# Patient Record
Sex: MALE | Race: WHITE | NOT HISPANIC OR LATINO | Employment: FULL TIME | URBAN - METROPOLITAN AREA
[De-identification: names, ages, dates, MRNs, and addresses within clinical notes are randomized per-mention and may not be internally consistent; named-entity substitution may affect disease eponyms.]

---

## 2020-04-27 ENCOUNTER — HOSPITAL ENCOUNTER (EMERGENCY)
Facility: HOSPITAL | Age: 60
Discharge: HOME | End: 2020-04-27
Attending: FAMILY MEDICINE

## 2020-04-27 VITALS
RESPIRATION RATE: 16 BRPM | DIASTOLIC BLOOD PRESSURE: 91 MMHG | HEART RATE: 100 BPM | HEIGHT: 72 IN | SYSTOLIC BLOOD PRESSURE: 169 MMHG | TEMPERATURE: 99 F | WEIGHT: 200 LBS | BODY MASS INDEX: 27.09 KG/M2 | OXYGEN SATURATION: 97 %

## 2020-04-27 DIAGNOSIS — F10.930 ALCOHOL WITHDRAWAL SYNDROME WITHOUT COMPLICATION: ICD-10-CM

## 2020-04-27 DIAGNOSIS — F41.9 ANXIETY: Primary | ICD-10-CM

## 2020-04-27 LAB
ALBUMIN SERPL BCP-MCNC: 4 G/DL (ref 3.5–5.2)
ALP SERPL-CCNC: 89 U/L (ref 55–135)
ALT SERPL W/O P-5'-P-CCNC: 49 U/L (ref 10–44)
AMPHET+METHAMPHET UR QL: NEGATIVE
ANION GAP SERPL CALC-SCNC: 18 MMOL/L (ref 8–16)
AST SERPL-CCNC: 84 U/L (ref 10–40)
BARBITURATES UR QL SCN>200 NG/ML: NEGATIVE
BASOPHILS # BLD AUTO: 0.05 K/UL (ref 0–0.2)
BASOPHILS NFR BLD: 0.6 % (ref 0–1.9)
BENZODIAZ UR QL SCN>200 NG/ML: NEGATIVE
BILIRUB SERPL-MCNC: 1.5 MG/DL (ref 0.1–1)
BUN SERPL-MCNC: 18 MG/DL (ref 6–20)
BZE UR QL SCN: NEGATIVE
CALCIUM SERPL-MCNC: 9 MG/DL (ref 8.7–10.5)
CANNABINOIDS UR QL SCN: NEGATIVE
CHLORIDE SERPL-SCNC: 96 MMOL/L (ref 95–110)
CO2 SERPL-SCNC: 21 MMOL/L (ref 23–29)
CREAT SERPL-MCNC: 1.1 MG/DL (ref 0.5–1.4)
CREAT UR-MCNC: 48.9 MG/DL (ref 23–375)
DIFFERENTIAL METHOD: ABNORMAL
EOSINOPHIL # BLD AUTO: 0.1 K/UL (ref 0–0.5)
EOSINOPHIL NFR BLD: 0.6 % (ref 0–8)
ERYTHROCYTE [DISTWIDTH] IN BLOOD BY AUTOMATED COUNT: 13.1 % (ref 11.5–14.5)
EST. GFR  (AFRICAN AMERICAN): >60 ML/MIN/1.73 M^2
EST. GFR  (NON AFRICAN AMERICAN): >60 ML/MIN/1.73 M^2
ETHANOL SERPL-MCNC: 148 MG/DL
GLUCOSE SERPL-MCNC: 68 MG/DL (ref 70–110)
HCT VFR BLD AUTO: 43.2 % (ref 40–54)
HGB BLD-MCNC: 14.5 G/DL (ref 14–18)
IMM GRANULOCYTES # BLD AUTO: 0.02 K/UL (ref 0–0.04)
IMM GRANULOCYTES NFR BLD AUTO: 0.2 % (ref 0–0.5)
LYMPHOCYTES # BLD AUTO: 2.1 K/UL (ref 1–4.8)
LYMPHOCYTES NFR BLD: 26.1 % (ref 18–48)
MCH RBC QN AUTO: 33.3 PG (ref 27–31)
MCHC RBC AUTO-ENTMCNC: 33.6 G/DL (ref 32–36)
MCV RBC AUTO: 99 FL (ref 82–98)
MONOCYTES # BLD AUTO: 1.1 K/UL (ref 0.3–1)
MONOCYTES NFR BLD: 13.4 % (ref 4–15)
NEUTROPHILS # BLD AUTO: 4.9 K/UL (ref 1.8–7.7)
NEUTROPHILS NFR BLD: 59.1 % (ref 38–73)
NRBC BLD-RTO: 0 /100 WBC
OPIATES UR QL SCN: NEGATIVE
PCP UR QL SCN>25 NG/ML: NEGATIVE
PLATELET # BLD AUTO: 110 K/UL (ref 150–350)
PMV BLD AUTO: 10 FL (ref 9.2–12.9)
POTASSIUM SERPL-SCNC: 4.3 MMOL/L (ref 3.5–5.1)
PROT SERPL-MCNC: 8.2 G/DL (ref 6–8.4)
RBC # BLD AUTO: 4.36 M/UL (ref 4.6–6.2)
SODIUM SERPL-SCNC: 135 MMOL/L (ref 136–145)
TOXICOLOGY INFORMATION: NORMAL
WBC # BLD AUTO: 8.21 K/UL (ref 3.9–12.7)

## 2020-04-27 PROCEDURE — 80320 DRUG SCREEN QUANTALCOHOLS: CPT

## 2020-04-27 PROCEDURE — 70450 CT HEAD/BRAIN W/O DYE: CPT | Mod: 26,,, | Performed by: RADIOLOGY

## 2020-04-27 PROCEDURE — 70450 CT HEAD WITHOUT CONTRAST: ICD-10-PCS | Mod: 26,,, | Performed by: RADIOLOGY

## 2020-04-27 PROCEDURE — 80053 COMPREHEN METABOLIC PANEL: CPT

## 2020-04-27 PROCEDURE — 36415 COLL VENOUS BLD VENIPUNCTURE: CPT

## 2020-04-27 PROCEDURE — 96361 HYDRATE IV INFUSION ADD-ON: CPT

## 2020-04-27 PROCEDURE — 63600175 PHARM REV CODE 636 W HCPCS: Performed by: FAMILY MEDICINE

## 2020-04-27 PROCEDURE — 85025 COMPLETE CBC W/AUTO DIFF WBC: CPT

## 2020-04-27 PROCEDURE — 70450 CT HEAD/BRAIN W/O DYE: CPT | Mod: TC

## 2020-04-27 PROCEDURE — 25000003 PHARM REV CODE 250: Performed by: FAMILY MEDICINE

## 2020-04-27 PROCEDURE — 99284 EMERGENCY DEPT VISIT MOD MDM: CPT | Mod: 25

## 2020-04-27 PROCEDURE — 80307 DRUG TEST PRSMV CHEM ANLYZR: CPT

## 2020-04-27 PROCEDURE — 96374 THER/PROPH/DIAG INJ IV PUSH: CPT

## 2020-04-27 RX ORDER — LORAZEPAM 2 MG/ML
0.5 INJECTION INTRAMUSCULAR
Status: COMPLETED | OUTPATIENT
Start: 2020-04-27 | End: 2020-04-27

## 2020-04-27 RX ORDER — HYDROXYZINE PAMOATE 25 MG/1
50 CAPSULE ORAL
Status: COMPLETED | OUTPATIENT
Start: 2020-04-27 | End: 2020-04-27

## 2020-04-27 RX ORDER — HYDROXYZINE PAMOATE 25 MG/1
50 CAPSULE ORAL 4 TIMES DAILY
Qty: 20 CAPSULE | Refills: 1 | Status: SHIPPED | OUTPATIENT
Start: 2020-04-27

## 2020-04-27 RX ADMIN — HYDROXYZINE PAMOATE 50 MG: 25 CAPSULE ORAL at 10:04

## 2020-04-27 RX ADMIN — SODIUM CHLORIDE 1000 ML: 0.9 INJECTION, SOLUTION INTRAVENOUS at 07:04

## 2020-04-27 RX ADMIN — LORAZEPAM 0.5 MG: 2 INJECTION, SOLUTION INTRAMUSCULAR; INTRAVENOUS at 07:04

## 2020-04-27 NOTE — ED TRIAGE NOTES
"Pt here with c/o ha, n/v. Onset yesterday afternoon around 1 pm. Pt states he is having diff with "putting words together"   "

## 2020-04-27 NOTE — ED PROVIDER NOTES
Encounter Date: 4/27/2020       History     Chief Complaint   Patient presents with    Headache    Nausea    Vomiting     59-year-old male presents complaining of headache shakiness tremor fatigue immediately for the past 12 hr states he has been drinking hard liquor daily he usually works as a  but has not had work over the last week or so denies any nausea vomiting diarrhea no history of liver failure no history of seizure or confusion        Review of patient's allergies indicates:  No Known Allergies  Past Medical History:   Diagnosis Date    Hepatitis      No past surgical history on file.  No family history on file.  Social History     Tobacco Use    Smoking status: Current Every Day Smoker    Smokeless tobacco: Never Used   Substance Use Topics    Alcohol use: Yes     Comment: jimenez drinker     Drug use: Never     Review of Systems   Constitutional: Positive for diaphoresis. Negative for fever.   HENT: Negative for sore throat.    Respiratory: Negative for shortness of breath.    Cardiovascular: Negative for chest pain.   Gastrointestinal: Negative for nausea.   Genitourinary: Negative for dysuria.   Musculoskeletal: Negative for back pain.   Skin: Negative for rash.   Neurological: Negative for weakness.   Hematological: Does not bruise/bleed easily.   Psychiatric/Behavioral: Negative for agitation and confusion.       Physical Exam     Initial Vitals   BP Pulse Resp Temp SpO2   04/27/20 0737 04/27/20 0737 04/27/20 0737 04/27/20 0738 04/27/20 0737   (!) 212/116 (!) 126 (!) 47 98.9 °F (37.2 °C) 98 %      MAP       --                Physical Exam    Nursing note and vitals reviewed.  Constitutional: He appears well-developed and well-nourished. He is not diaphoretic. No distress.   HENT:   Head: Normocephalic and atraumatic.   Right Ear: External ear normal.   Left Ear: External ear normal.   Nose: Nose normal.   Mouth/Throat: Oropharynx is clear and moist. No oropharyngeal exudate.    Eyes: EOM are normal.   Neck: Normal range of motion. Neck supple. No tracheal deviation present.   Cardiovascular: Normal rate and regular rhythm.   No murmur heard.  Pulmonary/Chest: Breath sounds normal. No stridor. No respiratory distress. He has no rales.   Abdominal: Soft. He exhibits no distension and no mass. There is no tenderness. There is no rebound.   Musculoskeletal: Normal range of motion. He exhibits no edema.   Lymphadenopathy:     He has no cervical adenopathy.   Neurological: He is alert and oriented to person, place, and time. He has normal strength.   Skin: Skin is warm and dry. Capillary refill takes less than 2 seconds. No pallor.   Psychiatric: He has a normal mood and affect.         ED Course   Procedures  Labs Reviewed   CBC W/ AUTO DIFFERENTIAL - Abnormal; Notable for the following components:       Result Value    RBC 4.36 (*)     Mean Corpuscular Volume 99 (*)     Mean Corpuscular Hemoglobin 33.3 (*)     Platelets 110 (*)     Mono # 1.1 (*)     All other components within normal limits   COMPREHENSIVE METABOLIC PANEL - Abnormal; Notable for the following components:    Sodium 135 (*)     CO2 21 (*)     Glucose 68 (*)     Total Bilirubin 1.5 (*)     AST 84 (*)     ALT 49 (*)     Anion Gap 18 (*)     All other components within normal limits   ALCOHOL,MEDICAL (ETHANOL) - Abnormal; Notable for the following components:    Alcohol, Medical, Serum 148 (*)     All other components within normal limits   DRUG SCREEN PANEL, URINE EMERGENCY          Imaging Results          CT Head Without Contrast (Final result)  Result time 04/27/20 08:57:54    Final result by Edi Mitchell MD (04/27/20 08:57:54)                 Impression:      1. No evidence of an acute intracranial abnormality.      Electronically signed by: Edi Mitchell  Date:    04/27/2020  Time:    08:57             Narrative:    EXAMINATION:  CT HEAD WITHOUT CONTRAST    CLINICAL HISTORY:  Dizziness;    TECHNIQUE:  Low dose axial images  were obtained through the head.  Coronal and sagittal reformations were also performed. Contrast was not administered.    COMPARISON:  None.    FINDINGS:  There is no acute intracranial hemorrhage.  Ventricles are of normal size and morphology.  No mass effect or midline shift is present.  The gray-white matter differentiation is normal. Patchy areas of periventricular and deep white matter hypoattenuation, which are nonspecific but likely represent a mild degree of chronic microvascular ischemic change. The visualized portions of the mastoids are normal.  The visualized portions of the paranasal sinuses are normal. The visualized portions of the orbits are normal.  No fractures are identified.                                                                 Clinical Impression:       ICD-10-CM ICD-9-CM   1. Anxiety F41.9 300.00   2. Alcohol withdrawal syndrome without complication F10.230 291.81             ED Disposition Condition    Discharge Stable        ED Prescriptions     Medication Sig Dispense Start Date End Date Auth. Provider    hydrOXYzine pamoate (VISTARIL) 25 MG Cap Take 2 capsules (50 mg total) by mouth 4 (four) times daily. 20 capsule 4/27/2020  Florencio Mariscal MD        Follow-up Information    None                                    Florencio Mariscal MD  04/27/20 8127

## 2021-04-28 ENCOUNTER — HOSPITAL ENCOUNTER (EMERGENCY)
Facility: HOSPITAL | Age: 61
Discharge: ANOTHER HOSPITAL | End: 2021-04-28
Attending: EMERGENCY MEDICINE
Payer: OTHER GOVERNMENT

## 2021-04-28 VITALS
HEIGHT: 73 IN | BODY MASS INDEX: 27.57 KG/M2 | RESPIRATION RATE: 20 BRPM | SYSTOLIC BLOOD PRESSURE: 159 MMHG | HEART RATE: 117 BPM | DIASTOLIC BLOOD PRESSURE: 96 MMHG | OXYGEN SATURATION: 97 % | WEIGHT: 208 LBS | TEMPERATURE: 99 F

## 2021-04-28 DIAGNOSIS — R47.01 APHASIA: ICD-10-CM

## 2021-04-28 DIAGNOSIS — R00.0 TACHYCARDIA: ICD-10-CM

## 2021-04-28 DIAGNOSIS — R29.90 STROKE-LIKE SYMPTOMS: Primary | ICD-10-CM

## 2021-04-28 LAB
ALBUMIN SERPL BCP-MCNC: 4.2 G/DL (ref 3.5–5.2)
ALP SERPL-CCNC: 90 U/L (ref 55–135)
ALT SERPL W/O P-5'-P-CCNC: 45 U/L (ref 10–44)
ANION GAP SERPL CALC-SCNC: 11 MMOL/L (ref 8–16)
APTT BLDCRRT: 25.5 SEC (ref 21–32)
AST SERPL-CCNC: 64 U/L (ref 10–40)
BASOPHILS # BLD AUTO: 0.04 K/UL (ref 0–0.2)
BASOPHILS NFR BLD: 0.6 % (ref 0–1.9)
BILIRUB SERPL-MCNC: 2.3 MG/DL (ref 0.1–1)
BUN SERPL-MCNC: 14 MG/DL (ref 6–20)
CALCIUM SERPL-MCNC: 10.1 MG/DL (ref 8.7–10.5)
CHLORIDE SERPL-SCNC: 97 MMOL/L (ref 95–110)
CO2 SERPL-SCNC: 25 MMOL/L (ref 23–29)
CREAT SERPL-MCNC: 1 MG/DL (ref 0.5–1.4)
DIFFERENTIAL METHOD: ABNORMAL
EOSINOPHIL # BLD AUTO: 0.1 K/UL (ref 0–0.5)
EOSINOPHIL NFR BLD: 1.5 % (ref 0–8)
ERYTHROCYTE [DISTWIDTH] IN BLOOD BY AUTOMATED COUNT: 13 % (ref 11.5–14.5)
EST. GFR  (AFRICAN AMERICAN): >60 ML/MIN/1.73 M^2
EST. GFR  (NON AFRICAN AMERICAN): >60 ML/MIN/1.73 M^2
GLUCOSE SERPL-MCNC: 161 MG/DL (ref 70–110)
HCT VFR BLD AUTO: 43.6 % (ref 40–54)
HGB BLD-MCNC: 15.6 G/DL (ref 14–18)
IMM GRANULOCYTES # BLD AUTO: 0.01 K/UL (ref 0–0.04)
IMM GRANULOCYTES NFR BLD AUTO: 0.1 % (ref 0–0.5)
INR PPP: 1.1 (ref 0.8–1.2)
LYMPHOCYTES # BLD AUTO: 1.3 K/UL (ref 1–4.8)
LYMPHOCYTES NFR BLD: 17.6 % (ref 18–48)
MCH RBC QN AUTO: 34.3 PG (ref 27–31)
MCHC RBC AUTO-ENTMCNC: 35.8 G/DL (ref 32–36)
MCV RBC AUTO: 96 FL (ref 82–98)
MONOCYTES # BLD AUTO: 1 K/UL (ref 0.3–1)
MONOCYTES NFR BLD: 14.2 % (ref 4–15)
NEUTROPHILS # BLD AUTO: 4.8 K/UL (ref 1.8–7.7)
NEUTROPHILS NFR BLD: 66 % (ref 38–73)
NRBC BLD-RTO: 0 /100 WBC
PLATELET # BLD AUTO: 158 K/UL (ref 150–450)
PMV BLD AUTO: 9.3 FL (ref 9.2–12.9)
POTASSIUM SERPL-SCNC: 3.4 MMOL/L (ref 3.5–5.1)
PROT SERPL-MCNC: 8.2 G/DL (ref 6–8.4)
PROTHROMBIN TIME: 11.6 SEC (ref 9–12.5)
RBC # BLD AUTO: 4.55 M/UL (ref 4.6–6.2)
SARS-COV-2 RDRP RESP QL NAA+PROBE: NEGATIVE
SODIUM SERPL-SCNC: 133 MMOL/L (ref 136–145)
TROPONIN I SERPL DL<=0.01 NG/ML-MCNC: 0.01 NG/ML (ref 0.02–0.5)
WBC # BLD AUTO: 7.23 K/UL (ref 3.9–12.7)

## 2021-04-28 PROCEDURE — 93010 ELECTROCARDIOGRAM REPORT: CPT | Mod: ,,, | Performed by: INTERNAL MEDICINE

## 2021-04-28 PROCEDURE — 96374 THER/PROPH/DIAG INJ IV PUSH: CPT

## 2021-04-28 PROCEDURE — 93010 EKG 12-LEAD: ICD-10-PCS | Mod: ,,, | Performed by: INTERNAL MEDICINE

## 2021-04-28 PROCEDURE — 80053 COMPREHEN METABOLIC PANEL: CPT | Performed by: PHYSICIAN ASSISTANT

## 2021-04-28 PROCEDURE — 85730 THROMBOPLASTIN TIME PARTIAL: CPT | Performed by: PHYSICIAN ASSISTANT

## 2021-04-28 PROCEDURE — 85610 PROTHROMBIN TIME: CPT | Performed by: PHYSICIAN ASSISTANT

## 2021-04-28 PROCEDURE — 93005 ELECTROCARDIOGRAM TRACING: CPT

## 2021-04-28 PROCEDURE — 63600175 PHARM REV CODE 636 W HCPCS: Performed by: PHYSICIAN ASSISTANT

## 2021-04-28 PROCEDURE — 85025 COMPLETE CBC W/AUTO DIFF WBC: CPT | Performed by: PHYSICIAN ASSISTANT

## 2021-04-28 PROCEDURE — 84484 ASSAY OF TROPONIN QUANT: CPT | Performed by: PHYSICIAN ASSISTANT

## 2021-04-28 PROCEDURE — U0002 COVID-19 LAB TEST NON-CDC: HCPCS | Performed by: PHYSICIAN ASSISTANT

## 2021-04-28 PROCEDURE — 99284 EMERGENCY DEPT VISIT MOD MDM: CPT | Mod: 25

## 2021-04-28 RX ORDER — HYDRALAZINE HYDROCHLORIDE 20 MG/ML
10 INJECTION INTRAMUSCULAR; INTRAVENOUS
Status: COMPLETED | OUTPATIENT
Start: 2021-04-28 | End: 2021-04-28

## 2021-04-28 RX ADMIN — HYDRALAZINE HYDROCHLORIDE 10 MG: 20 INJECTION INTRAMUSCULAR; INTRAVENOUS at 02:04

## 2024-06-23 ENCOUNTER — HOSPITAL ENCOUNTER (INPATIENT)
Facility: HOSPITAL | Age: 64
LOS: 1 days | Discharge: ANOTHER HOSPITAL | DRG: 917 | End: 2024-06-23
Attending: EMERGENCY MEDICINE | Admitting: INTERNAL MEDICINE

## 2024-06-23 VITALS
OXYGEN SATURATION: 93 % | SYSTOLIC BLOOD PRESSURE: 96 MMHG | WEIGHT: 210 LBS | BODY MASS INDEX: 28.44 KG/M2 | TEMPERATURE: 98 F | HEART RATE: 91 BPM | HEIGHT: 72 IN | RESPIRATION RATE: 24 BRPM | DIASTOLIC BLOOD PRESSURE: 52 MMHG

## 2024-06-23 DIAGNOSIS — I95.2 DRUG-INDUCED HYPOTENSION: ICD-10-CM

## 2024-06-23 DIAGNOSIS — R06.02 SOB (SHORTNESS OF BREATH) ON EXERTION: ICD-10-CM

## 2024-06-23 DIAGNOSIS — R07.9 CHEST PAIN: ICD-10-CM

## 2024-06-23 DIAGNOSIS — Z45.2 ENCOUNTER FOR CENTRAL LINE PLACEMENT: ICD-10-CM

## 2024-06-23 DIAGNOSIS — I21.4 NON-ST ELEVATION MI (NSTEMI): ICD-10-CM

## 2024-06-23 DIAGNOSIS — T50.901A OVERDOSE: ICD-10-CM

## 2024-06-23 DIAGNOSIS — I50.9 CHF (CONGESTIVE HEART FAILURE): ICD-10-CM

## 2024-06-23 DIAGNOSIS — T46.1X2A: Primary | ICD-10-CM

## 2024-06-23 DIAGNOSIS — Z01.818 ENCOUNTER FOR INTUBATION: ICD-10-CM

## 2024-06-23 PROBLEM — F10.10 ALCOHOL ABUSE: Status: ACTIVE | Noted: 2024-06-23

## 2024-06-23 PROBLEM — R06.03 ACUTE RESPIRATORY DISTRESS: Status: ACTIVE | Noted: 2024-06-23

## 2024-06-23 LAB
ALBUMIN SERPL BCP-MCNC: 3.4 G/DL (ref 3.5–5.2)
ALBUMIN SERPL BCP-MCNC: 3.7 G/DL (ref 3.5–5.2)
ALBUMIN SERPL BCP-MCNC: 4.1 G/DL (ref 3.5–5.2)
ALLENS TEST: ABNORMAL
ALP SERPL-CCNC: 108 U/L (ref 55–135)
ALP SERPL-CCNC: 88 U/L (ref 55–135)
ALP SERPL-CCNC: 97 U/L (ref 55–135)
ALT SERPL W/O P-5'-P-CCNC: 53 U/L (ref 10–44)
ALT SERPL W/O P-5'-P-CCNC: 64 U/L (ref 10–44)
ALT SERPL W/O P-5'-P-CCNC: 83 U/L (ref 10–44)
AMORPH CRY URNS QL MICRO: ABNORMAL
AMPHET+METHAMPHET UR QL: NEGATIVE
ANION GAP SERPL CALC-SCNC: 15 MMOL/L (ref 8–16)
ANION GAP SERPL CALC-SCNC: 18 MMOL/L (ref 8–16)
ANION GAP SERPL CALC-SCNC: 19 MMOL/L (ref 8–16)
APAP SERPL-MCNC: <3 UG/ML (ref 10–20)
APTT PPP: 28 SEC (ref 21–32)
AST SERPL-CCNC: 116 U/L (ref 10–40)
AST SERPL-CCNC: 50 U/L (ref 10–40)
AST SERPL-CCNC: 66 U/L (ref 10–40)
BACTERIA #/AREA URNS HPF: ABNORMAL /HPF
BARBITURATES UR QL SCN>200 NG/ML: NEGATIVE
BASOPHILS # BLD AUTO: 0.07 K/UL (ref 0–0.2)
BASOPHILS NFR BLD: 0.9 % (ref 0–1.9)
BENZODIAZ UR QL SCN>200 NG/ML: NEGATIVE
BILIRUB SERPL-MCNC: 0.9 MG/DL (ref 0.1–1)
BILIRUB SERPL-MCNC: 1.3 MG/DL (ref 0.1–1)
BILIRUB SERPL-MCNC: 1.3 MG/DL (ref 0.1–1)
BILIRUB UR QL STRIP: ABNORMAL
BILIRUB UR QL STRIP: NEGATIVE
BNP SERPL-MCNC: 116 PG/ML (ref 0–99)
BUN SERPL-MCNC: 11 MG/DL (ref 8–23)
BUN SERPL-MCNC: 12 MG/DL (ref 8–23)
BUN SERPL-MCNC: 12 MG/DL (ref 8–23)
BZE UR QL SCN: NEGATIVE
CALCIUM SERPL-MCNC: 8.2 MG/DL (ref 8.7–10.5)
CALCIUM SERPL-MCNC: 9 MG/DL (ref 8.7–10.5)
CALCIUM SERPL-MCNC: 9.1 MG/DL (ref 8.7–10.5)
CANNABINOIDS UR QL SCN: NEGATIVE
CHLORIDE SERPL-SCNC: 103 MMOL/L (ref 95–110)
CHLORIDE SERPL-SCNC: 105 MMOL/L (ref 95–110)
CHLORIDE SERPL-SCNC: 106 MMOL/L (ref 95–110)
CK SERPL-CCNC: 86 U/L (ref 20–200)
CLARITY UR: ABNORMAL
CLARITY UR: CLEAR
CO2 SERPL-SCNC: 12 MMOL/L (ref 23–29)
CO2 SERPL-SCNC: 13 MMOL/L (ref 23–29)
CO2 SERPL-SCNC: 14 MMOL/L (ref 23–29)
COLOR UR: YELLOW
COLOR UR: YELLOW
CREAT SERPL-MCNC: 1.7 MG/DL (ref 0.5–1.4)
CREAT SERPL-MCNC: 2.7 MG/DL (ref 0.5–1.4)
CREAT SERPL-MCNC: 3.1 MG/DL (ref 0.5–1.4)
CREAT UR-MCNC: 25.6 MG/DL (ref 23–375)
DELSYS: ABNORMAL
DIFFERENTIAL METHOD BLD: ABNORMAL
EOSINOPHIL # BLD AUTO: 0.1 K/UL (ref 0–0.5)
EOSINOPHIL NFR BLD: 0.9 % (ref 0–8)
ERYTHROCYTE [DISTWIDTH] IN BLOOD BY AUTOMATED COUNT: 13.2 % (ref 11.5–14.5)
EST. GFR  (NO RACE VARIABLE): 21.8 ML/MIN/1.73 M^2
EST. GFR  (NO RACE VARIABLE): 25.7 ML/MIN/1.73 M^2
EST. GFR  (NO RACE VARIABLE): 44.7 ML/MIN/1.73 M^2
ETHANOL SERPL-MCNC: 164 MG/DL (ref 0–10)
FIO2: 32
FIO2: 40
FLOW: 3
FLOW: 5
FLOW: 8
GLUCOSE SERPL-MCNC: 163 MG/DL (ref 70–110)
GLUCOSE SERPL-MCNC: 211 MG/DL (ref 70–110)
GLUCOSE SERPL-MCNC: 219 MG/DL (ref 70–110)
GLUCOSE UR QL STRIP: NEGATIVE
GLUCOSE UR QL STRIP: NEGATIVE
GRAN CASTS #/AREA URNS LPF: 2 /LPF
HCO3 UR-SCNC: 10.4 MMOL/L (ref 24–28)
HCO3 UR-SCNC: 11.7 MMOL/L (ref 24–28)
HCO3 UR-SCNC: 14.1 MMOL/L (ref 24–28)
HCT VFR BLD AUTO: 42.4 % (ref 40–54)
HGB BLD-MCNC: 15.5 G/DL (ref 14–18)
HGB UR QL STRIP: ABNORMAL
HGB UR QL STRIP: NEGATIVE
HYALINE CASTS #/AREA URNS LPF: 0 /LPF
IMM GRANULOCYTES # BLD AUTO: 0.03 K/UL (ref 0–0.04)
IMM GRANULOCYTES NFR BLD AUTO: 0.4 % (ref 0–0.5)
INR PPP: 1.2 (ref 0.8–1.2)
KETONES UR QL STRIP: ABNORMAL
KETONES UR QL STRIP: NEGATIVE
LEUKOCYTE ESTERASE UR QL STRIP: NEGATIVE
LEUKOCYTE ESTERASE UR QL STRIP: NEGATIVE
LYMPHOCYTES # BLD AUTO: 1.9 K/UL (ref 1–4.8)
LYMPHOCYTES NFR BLD: 24 % (ref 18–48)
MCH RBC QN AUTO: 33.4 PG (ref 27–31)
MCHC RBC AUTO-ENTMCNC: 36.6 G/DL (ref 32–36)
MCV RBC AUTO: 91 FL (ref 82–98)
METHADONE UR QL SCN>300 NG/ML: NEGATIVE
MICROSCOPIC COMMENT: ABNORMAL
MODE: ABNORMAL
MONOCYTES # BLD AUTO: 0.8 K/UL (ref 0.3–1)
MONOCYTES NFR BLD: 9.9 % (ref 4–15)
NEUTROPHILS # BLD AUTO: 5.1 K/UL (ref 1.8–7.7)
NEUTROPHILS NFR BLD: 63.9 % (ref 38–73)
NITRITE UR QL STRIP: NEGATIVE
NITRITE UR QL STRIP: NEGATIVE
NRBC BLD-RTO: 0 /100 WBC
OPIATES UR QL SCN: NEGATIVE
PCO2 BLDA: 18.8 MMHG (ref 35–45)
PCO2 BLDA: 21.6 MMHG (ref 35–45)
PCO2 BLDA: 21.9 MMHG (ref 35–45)
PCP UR QL SCN>25 NG/ML: NEGATIVE
PH SMN: 7.33 [PH] (ref 7.35–7.45)
PH SMN: 7.35 [PH] (ref 7.35–7.45)
PH SMN: 7.42 [PH] (ref 7.35–7.45)
PH UR STRIP: 5 [PH] (ref 5–8)
PH UR STRIP: 6 [PH] (ref 5–8)
PLATELET # BLD AUTO: 215 K/UL (ref 150–450)
PMV BLD AUTO: 8.4 FL (ref 9.2–12.9)
PO2 BLDA: 52 MMHG (ref 80–100)
PO2 BLDA: 65 MMHG (ref 80–100)
PO2 BLDA: 99 MMHG (ref 80–100)
POC BE: -10 MMOL/L
POC BE: -14 MMOL/L
POC BE: -15 MMOL/L
POC SATURATED O2: 88 % (ref 95–100)
POC SATURATED O2: 92 % (ref 95–100)
POC SATURATED O2: 97 % (ref 95–100)
POCT GLUCOSE: 208 MG/DL (ref 70–110)
POTASSIUM SERPL-SCNC: 3.7 MMOL/L (ref 3.5–5.1)
POTASSIUM SERPL-SCNC: 4 MMOL/L (ref 3.5–5.1)
POTASSIUM SERPL-SCNC: 4.2 MMOL/L (ref 3.5–5.1)
PROT SERPL-MCNC: 6.2 G/DL (ref 6–8.4)
PROT SERPL-MCNC: 6.8 G/DL (ref 6–8.4)
PROT SERPL-MCNC: 7.5 G/DL (ref 6–8.4)
PROT UR QL STRIP: ABNORMAL
PROT UR QL STRIP: NEGATIVE
PROTHROMBIN TIME: 12.4 SEC (ref 9–12.5)
RBC # BLD AUTO: 4.64 M/UL (ref 4.6–6.2)
RBC #/AREA URNS HPF: 18 /HPF (ref 0–4)
SALICYLATES SERPL-MCNC: <5 MG/DL (ref 15–30)
SAMPLE: ABNORMAL
SARS-COV-2 RDRP RESP QL NAA+PROBE: NEGATIVE
SITE: ABNORMAL
SODIUM SERPL-SCNC: 133 MMOL/L (ref 136–145)
SODIUM SERPL-SCNC: 134 MMOL/L (ref 136–145)
SODIUM SERPL-SCNC: 138 MMOL/L (ref 136–145)
SP GR UR STRIP: 1.01 (ref 1–1.03)
SP GR UR STRIP: <=1.005 (ref 1–1.03)
SQUAMOUS #/AREA URNS HPF: 2 /HPF
T4 FREE SERPL-MCNC: 1.05 NG/DL (ref 0.71–1.51)
TOXICOLOGY INFORMATION: NORMAL
TROPONIN I SERPL DL<=0.01 NG/ML-MCNC: 0.14 NG/ML (ref 0–0.03)
TSH SERPL DL<=0.005 MIU/L-ACNC: 5.27 UIU/ML (ref 0.4–4)
URN SPEC COLLECT METH UR: ABNORMAL
URN SPEC COLLECT METH UR: ABNORMAL
UROBILINOGEN UR STRIP-ACNC: NEGATIVE EU/DL
UROBILINOGEN UR STRIP-ACNC: NEGATIVE EU/DL
WBC # BLD AUTO: 7.89 K/UL (ref 3.9–12.7)
WBC #/AREA URNS HPF: 3 /HPF (ref 0–5)

## 2024-06-23 PROCEDURE — 81000 URINALYSIS NONAUTO W/SCOPE: CPT | Mod: 59 | Performed by: INTERNAL MEDICINE

## 2024-06-23 PROCEDURE — 99291 CRITICAL CARE FIRST HOUR: CPT

## 2024-06-23 PROCEDURE — 5A1935Z RESPIRATORY VENTILATION, LESS THAN 24 CONSECUTIVE HOURS: ICD-10-PCS | Performed by: INTERNAL MEDICINE

## 2024-06-23 PROCEDURE — 82803 BLOOD GASES ANY COMBINATION: CPT

## 2024-06-23 PROCEDURE — 93005 ELECTROCARDIOGRAM TRACING: CPT

## 2024-06-23 PROCEDURE — 63600175 PHARM REV CODE 636 W HCPCS

## 2024-06-23 PROCEDURE — 80053 COMPREHEN METABOLIC PANEL: CPT | Mod: 91 | Performed by: INTERNAL MEDICINE

## 2024-06-23 PROCEDURE — 25000242 PHARM REV CODE 250 ALT 637 W/ HCPCS: Performed by: INTERNAL MEDICINE

## 2024-06-23 PROCEDURE — 85610 PROTHROMBIN TIME: CPT | Performed by: INTERNAL MEDICINE

## 2024-06-23 PROCEDURE — 0BH17EZ INSERTION OF ENDOTRACHEAL AIRWAY INTO TRACHEA, VIA NATURAL OR ARTIFICIAL OPENING: ICD-10-PCS | Performed by: EMERGENCY MEDICINE

## 2024-06-23 PROCEDURE — 96361 HYDRATE IV INFUSION ADD-ON: CPT

## 2024-06-23 PROCEDURE — 80307 DRUG TEST PRSMV CHEM ANLYZR: CPT | Performed by: EMERGENCY MEDICINE

## 2024-06-23 PROCEDURE — U0002 COVID-19 LAB TEST NON-CDC: HCPCS | Performed by: EMERGENCY MEDICINE

## 2024-06-23 PROCEDURE — 80053 COMPREHEN METABOLIC PANEL: CPT | Performed by: EMERGENCY MEDICINE

## 2024-06-23 PROCEDURE — 94640 AIRWAY INHALATION TREATMENT: CPT

## 2024-06-23 PROCEDURE — 51702 INSERT TEMP BLADDER CATH: CPT

## 2024-06-23 PROCEDURE — 36415 COLL VENOUS BLD VENIPUNCTURE: CPT | Performed by: STUDENT IN AN ORGANIZED HEALTH CARE EDUCATION/TRAINING PROGRAM

## 2024-06-23 PROCEDURE — 87040 BLOOD CULTURE FOR BACTERIA: CPT | Performed by: INTERNAL MEDICINE

## 2024-06-23 PROCEDURE — 63600175 PHARM REV CODE 636 W HCPCS: Performed by: INTERNAL MEDICINE

## 2024-06-23 PROCEDURE — 99223 1ST HOSP IP/OBS HIGH 75: CPT | Mod: ,,, | Performed by: INTERNAL MEDICINE

## 2024-06-23 PROCEDURE — 96374 THER/PROPH/DIAG INJ IV PUSH: CPT | Mod: 59

## 2024-06-23 PROCEDURE — 36556 INSERT NON-TUNNEL CV CATH: CPT

## 2024-06-23 PROCEDURE — 27100171 HC OXYGEN HIGH FLOW UP TO 24 HOURS

## 2024-06-23 PROCEDURE — 82962 GLUCOSE BLOOD TEST: CPT

## 2024-06-23 PROCEDURE — 96375 TX/PRO/DX INJ NEW DRUG ADDON: CPT

## 2024-06-23 PROCEDURE — 84443 ASSAY THYROID STIM HORMONE: CPT | Performed by: EMERGENCY MEDICINE

## 2024-06-23 PROCEDURE — 36600 WITHDRAWAL OF ARTERIAL BLOOD: CPT

## 2024-06-23 PROCEDURE — 12000002 HC ACUTE/MED SURGE SEMI-PRIVATE ROOM

## 2024-06-23 PROCEDURE — 96367 TX/PROPH/DG ADDL SEQ IV INF: CPT

## 2024-06-23 PROCEDURE — 71045 X-RAY EXAM CHEST 1 VIEW: CPT | Mod: 26,,, | Performed by: RADIOLOGY

## 2024-06-23 PROCEDURE — 80179 DRUG ASSAY SALICYLATE: CPT | Performed by: EMERGENCY MEDICINE

## 2024-06-23 PROCEDURE — 99900035 HC TECH TIME PER 15 MIN (STAT)

## 2024-06-23 PROCEDURE — 93010 ELECTROCARDIOGRAM REPORT: CPT | Mod: ,,, | Performed by: INTERNAL MEDICINE

## 2024-06-23 PROCEDURE — 99900031 HC PATIENT EDUCATION (STAT)

## 2024-06-23 PROCEDURE — 02HV33Z INSERTION OF INFUSION DEVICE INTO SUPERIOR VENA CAVA, PERCUTANEOUS APPROACH: ICD-10-PCS | Performed by: EMERGENCY MEDICINE

## 2024-06-23 PROCEDURE — 25000003 PHARM REV CODE 250

## 2024-06-23 PROCEDURE — 82077 ASSAY SPEC XCP UR&BREATH IA: CPT | Performed by: EMERGENCY MEDICINE

## 2024-06-23 PROCEDURE — 71045 X-RAY EXAM CHEST 1 VIEW: CPT | Mod: TC

## 2024-06-23 PROCEDURE — 36415 COLL VENOUS BLD VENIPUNCTURE: CPT | Mod: XB | Performed by: INTERNAL MEDICINE

## 2024-06-23 PROCEDURE — 81003 URINALYSIS AUTO W/O SCOPE: CPT | Mod: 59 | Performed by: EMERGENCY MEDICINE

## 2024-06-23 PROCEDURE — 82550 ASSAY OF CK (CPK): CPT | Performed by: INTERNAL MEDICINE

## 2024-06-23 PROCEDURE — 80143 DRUG ASSAY ACETAMINOPHEN: CPT | Performed by: EMERGENCY MEDICINE

## 2024-06-23 PROCEDURE — 71045 X-RAY EXAM CHEST 1 VIEW: CPT | Mod: 26,76,, | Performed by: RADIOLOGY

## 2024-06-23 PROCEDURE — 25000003 PHARM REV CODE 250: Performed by: INTERNAL MEDICINE

## 2024-06-23 PROCEDURE — 96365 THER/PROPH/DIAG IV INF INIT: CPT

## 2024-06-23 PROCEDURE — 80053 COMPREHEN METABOLIC PANEL: CPT | Mod: 91 | Performed by: STUDENT IN AN ORGANIZED HEALTH CARE EDUCATION/TRAINING PROGRAM

## 2024-06-23 PROCEDURE — 83880 ASSAY OF NATRIURETIC PEPTIDE: CPT | Performed by: INTERNAL MEDICINE

## 2024-06-23 PROCEDURE — 84484 ASSAY OF TROPONIN QUANT: CPT | Performed by: INTERNAL MEDICINE

## 2024-06-23 PROCEDURE — 85025 COMPLETE CBC W/AUTO DIFF WBC: CPT | Performed by: EMERGENCY MEDICINE

## 2024-06-23 PROCEDURE — 27000221 HC OXYGEN, UP TO 24 HOURS

## 2024-06-23 PROCEDURE — 25000003 PHARM REV CODE 250: Performed by: EMERGENCY MEDICINE

## 2024-06-23 PROCEDURE — 85730 THROMBOPLASTIN TIME PARTIAL: CPT | Performed by: INTERNAL MEDICINE

## 2024-06-23 PROCEDURE — 84439 ASSAY OF FREE THYROXINE: CPT | Performed by: EMERGENCY MEDICINE

## 2024-06-23 PROCEDURE — 94761 N-INVAS EAR/PLS OXIMETRY MLT: CPT | Mod: XB

## 2024-06-23 PROCEDURE — 31500 INSERT EMERGENCY AIRWAY: CPT

## 2024-06-23 PROCEDURE — 63600175 PHARM REV CODE 636 W HCPCS: Performed by: EMERGENCY MEDICINE

## 2024-06-23 PROCEDURE — 94002 VENT MGMT INPAT INIT DAY: CPT

## 2024-06-23 RX ORDER — LORAZEPAM 2 MG/ML
INJECTION INTRAMUSCULAR
Status: DISCONTINUED
Start: 2024-06-23 | End: 2024-06-24 | Stop reason: HOSPADM

## 2024-06-23 RX ORDER — SODIUM CHLORIDE 9 MG/ML
500 INJECTION, SOLUTION INTRAVENOUS
Status: COMPLETED | OUTPATIENT
Start: 2024-06-23 | End: 2024-06-23

## 2024-06-23 RX ORDER — FENTANYL CITRATE-0.9 % NACL/PF 10 MCG/ML
0-200 PLASTIC BAG, INJECTION (ML) INTRAVENOUS CONTINUOUS
Status: DISCONTINUED | OUTPATIENT
Start: 2024-06-24 | End: 2024-06-24 | Stop reason: HOSPADM

## 2024-06-23 RX ORDER — FENTANYL CITRATE 50 UG/ML
100 INJECTION, SOLUTION INTRAMUSCULAR; INTRAVENOUS EVERY 30 MIN PRN
Status: DISCONTINUED | OUTPATIENT
Start: 2024-06-23 | End: 2024-06-24 | Stop reason: HOSPADM

## 2024-06-23 RX ORDER — NOREPINEPHRINE BITARTRATE/D5W 4MG/250ML
PLASTIC BAG, INJECTION (ML) INTRAVENOUS
Status: COMPLETED
Start: 2024-06-23 | End: 2024-06-23

## 2024-06-23 RX ORDER — NALOXONE HCL 0.4 MG/ML
0.02 VIAL (ML) INJECTION
Status: DISCONTINUED | OUTPATIENT
Start: 2024-06-23 | End: 2024-06-24 | Stop reason: HOSPADM

## 2024-06-23 RX ORDER — SODIUM,POTASSIUM PHOSPHATES 280-250MG
2 POWDER IN PACKET (EA) ORAL
Status: DISCONTINUED | OUTPATIENT
Start: 2024-06-23 | End: 2024-06-24 | Stop reason: HOSPADM

## 2024-06-23 RX ORDER — LANOLIN ALCOHOL/MO/W.PET/CERES
800 CREAM (GRAM) TOPICAL
Status: DISCONTINUED | OUTPATIENT
Start: 2024-06-23 | End: 2024-06-24 | Stop reason: HOSPADM

## 2024-06-23 RX ORDER — NOREPINEPHRINE BITARTRATE/D5W 8 MG/250ML
0-1 PLASTIC BAG, INJECTION (ML) INTRAVENOUS CONTINUOUS
Status: DISCONTINUED | OUTPATIENT
Start: 2024-06-23 | End: 2024-06-24 | Stop reason: HOSPADM

## 2024-06-23 RX ORDER — ACETAMINOPHEN 325 MG/1
650 TABLET ORAL EVERY 4 HOURS PRN
Status: DISCONTINUED | OUTPATIENT
Start: 2024-06-23 | End: 2024-06-24 | Stop reason: HOSPADM

## 2024-06-23 RX ORDER — MORPHINE SULFATE 4 MG/ML
2 INJECTION, SOLUTION INTRAMUSCULAR; INTRAVENOUS EVERY 4 HOURS PRN
Status: DISCONTINUED | OUTPATIENT
Start: 2024-06-23 | End: 2024-06-24 | Stop reason: HOSPADM

## 2024-06-23 RX ORDER — MIDAZOLAM HYDROCHLORIDE 2 MG/2ML
INJECTION, SOLUTION INTRAMUSCULAR; INTRAVENOUS
Status: COMPLETED
Start: 2024-06-23 | End: 2024-06-23

## 2024-06-23 RX ORDER — INDOMETHACIN 25 MG/1
CAPSULE ORAL
Status: DISCONTINUED
Start: 2024-06-23 | End: 2024-06-24 | Stop reason: HOSPADM

## 2024-06-23 RX ORDER — ONDANSETRON HYDROCHLORIDE 2 MG/ML
INJECTION, SOLUTION INTRAVENOUS
Status: COMPLETED
Start: 2024-06-23 | End: 2024-06-23

## 2024-06-23 RX ORDER — PROCHLORPERAZINE EDISYLATE 5 MG/ML
5 INJECTION INTRAMUSCULAR; INTRAVENOUS EVERY 6 HOURS PRN
Status: DISCONTINUED | OUTPATIENT
Start: 2024-06-23 | End: 2024-06-24 | Stop reason: HOSPADM

## 2024-06-23 RX ORDER — INDOMETHACIN 25 MG/1
25 CAPSULE ORAL
Status: COMPLETED | OUTPATIENT
Start: 2024-06-23 | End: 2024-06-23

## 2024-06-23 RX ORDER — DEXMEDETOMIDINE HYDROCHLORIDE 4 UG/ML
0-1.4 INJECTION, SOLUTION INTRAVENOUS CONTINUOUS
Status: DISCONTINUED | OUTPATIENT
Start: 2024-06-23 | End: 2024-06-24 | Stop reason: HOSPADM

## 2024-06-23 RX ORDER — POLYETHYLENE GLYCOL 3350 17 G/17G
17 POWDER, FOR SOLUTION ORAL DAILY
Status: DISCONTINUED | OUTPATIENT
Start: 2024-06-24 | End: 2024-06-24 | Stop reason: HOSPADM

## 2024-06-23 RX ORDER — NOREPINEPHRINE BITARTRATE/D5W 4MG/250ML
0-1 PLASTIC BAG, INJECTION (ML) INTRAVENOUS CONTINUOUS
Status: DISCONTINUED | OUTPATIENT
Start: 2024-06-23 | End: 2024-06-23 | Stop reason: CLARIF

## 2024-06-23 RX ORDER — ONDANSETRON HYDROCHLORIDE 2 MG/ML
4 INJECTION, SOLUTION INTRAVENOUS EVERY 8 HOURS PRN
Status: DISCONTINUED | OUTPATIENT
Start: 2024-06-23 | End: 2024-06-24 | Stop reason: HOSPADM

## 2024-06-23 RX ORDER — TALC
6 POWDER (GRAM) TOPICAL NIGHTLY PRN
Status: DISCONTINUED | OUTPATIENT
Start: 2024-06-23 | End: 2024-06-24 | Stop reason: HOSPADM

## 2024-06-23 RX ORDER — FENTANYL CITRATE-0.9 % NACL/PF 10 MCG/ML
PLASTIC BAG, INJECTION (ML) INTRAVENOUS
Status: COMPLETED
Start: 2024-06-23 | End: 2024-06-23

## 2024-06-23 RX ORDER — IPRATROPIUM BROMIDE AND ALBUTEROL SULFATE 2.5; .5 MG/3ML; MG/3ML
3 SOLUTION RESPIRATORY (INHALATION) EVERY 4 HOURS
Status: DISCONTINUED | OUTPATIENT
Start: 2024-06-23 | End: 2024-06-24 | Stop reason: HOSPADM

## 2024-06-23 RX ORDER — CALCIUM GLUCONATE 98 MG/ML
1 INJECTION, SOLUTION INTRAVENOUS ONCE
Status: COMPLETED | OUTPATIENT
Start: 2024-06-23 | End: 2024-06-23

## 2024-06-23 RX ORDER — ETOMIDATE 2 MG/ML
25 INJECTION INTRAVENOUS
Status: COMPLETED | OUTPATIENT
Start: 2024-06-23 | End: 2024-06-23

## 2024-06-23 RX ORDER — ALUMINUM HYDROXIDE, MAGNESIUM HYDROXIDE, AND SIMETHICONE 1200; 120; 1200 MG/30ML; MG/30ML; MG/30ML
30 SUSPENSION ORAL 4 TIMES DAILY PRN
Status: DISCONTINUED | OUTPATIENT
Start: 2024-06-23 | End: 2024-06-24 | Stop reason: HOSPADM

## 2024-06-23 RX ORDER — IPRATROPIUM BROMIDE AND ALBUTEROL SULFATE 2.5; .5 MG/3ML; MG/3ML
3 SOLUTION RESPIRATORY (INHALATION) EVERY 4 HOURS
Status: DISCONTINUED | OUTPATIENT
Start: 2024-06-23 | End: 2024-06-23

## 2024-06-23 RX ORDER — GLUCAGON 1 MG
1 KIT INJECTION
Status: DISCONTINUED | OUTPATIENT
Start: 2024-06-23 | End: 2024-06-24 | Stop reason: HOSPADM

## 2024-06-23 RX ORDER — SIMETHICONE 80 MG
1 TABLET,CHEWABLE ORAL 4 TIMES DAILY PRN
Status: DISCONTINUED | OUTPATIENT
Start: 2024-06-23 | End: 2024-06-24 | Stop reason: HOSPADM

## 2024-06-23 RX ORDER — ONDANSETRON HYDROCHLORIDE 2 MG/ML
8 INJECTION, SOLUTION INTRAVENOUS
Status: COMPLETED | OUTPATIENT
Start: 2024-06-23 | End: 2024-06-23

## 2024-06-23 RX ORDER — MUPIROCIN 20 MG/G
OINTMENT TOPICAL 2 TIMES DAILY
Status: DISCONTINUED | OUTPATIENT
Start: 2024-06-23 | End: 2024-06-24 | Stop reason: HOSPADM

## 2024-06-23 RX ORDER — SUCCINYLCHOLINE CHLORIDE 20 MG/ML
140 INJECTION INTRAMUSCULAR; INTRAVENOUS
Status: COMPLETED | OUTPATIENT
Start: 2024-06-23 | End: 2024-06-23

## 2024-06-23 RX ADMIN — SODIUM CHLORIDE, POTASSIUM CHLORIDE, SODIUM LACTATE AND CALCIUM CHLORIDE 1000 ML: 600; 310; 30; 20 INJECTION, SOLUTION INTRAVENOUS at 05:06

## 2024-06-23 RX ADMIN — SUCCINYLCHOLINE CHLORIDE 140 MG: 20 INJECTION, SOLUTION INTRAMUSCULAR; INTRAVENOUS at 10:06

## 2024-06-23 RX ADMIN — SODIUM CHLORIDE 500 ML: 9 INJECTION, SOLUTION INTRAVENOUS at 09:06

## 2024-06-23 RX ADMIN — NOREPINEPHRINE BITARTRATE 1 MCG/KG/MIN: 4 INJECTION, SOLUTION INTRAVENOUS at 07:06

## 2024-06-23 RX ADMIN — NOREPINEPHRINE BITARTRATE 0.3 MCG/KG/MIN: 8 INJECTION, SOLUTION INTRAVENOUS at 09:06

## 2024-06-23 RX ADMIN — NOREPINEPHRINE BITARTRATE 0.2 MCG/KG/MIN: 8 INJECTION, SOLUTION INTRAVENOUS at 12:06

## 2024-06-23 RX ADMIN — PROMETHAZINE HYDROCHLORIDE 12.5 MG: 25 INJECTION INTRAMUSCULAR; INTRAVENOUS at 07:06

## 2024-06-23 RX ADMIN — LORAZEPAM 2 MG: 2 INJECTION INTRAMUSCULAR; INTRAVENOUS at 12:06

## 2024-06-23 RX ADMIN — SODIUM BICARBONATE 25 MEQ: 84 INJECTION, SOLUTION INTRAVENOUS at 10:06

## 2024-06-23 RX ADMIN — ONDANSETRON HYDROCHLORIDE 8 MG: 2 INJECTION, SOLUTION INTRAVENOUS at 05:06

## 2024-06-23 RX ADMIN — IPRATROPIUM BROMIDE AND ALBUTEROL SULFATE 3 ML: .5; 2.5 SOLUTION RESPIRATORY (INHALATION) at 04:06

## 2024-06-23 RX ADMIN — CALCIUM CHLORIDE INJECTION 1 G: 100 INJECTION, SOLUTION INTRAVENOUS at 07:06

## 2024-06-23 RX ADMIN — SODIUM CHLORIDE 500 ML: 9 INJECTION, SOLUTION INTRAVENOUS at 04:06

## 2024-06-23 RX ADMIN — ETOMIDATE 25 MG: 2 INJECTION INTRAVENOUS at 10:06

## 2024-06-23 RX ADMIN — DEXMEDETOMIDINE HYDROCHLORIDE 0.4 MCG/KG/HR: 4 INJECTION INTRAVENOUS at 10:06

## 2024-06-23 RX ADMIN — NOREPINEPHRINE BITARTRATE 0.2 MCG/KG/MIN: 8 INJECTION, SOLUTION INTRAVENOUS at 03:06

## 2024-06-23 RX ADMIN — NOREPINEPHRINE BITARTRATE 1 MCG/KG/MIN: 4 INJECTION, SOLUTION INTRAVENOUS at 06:06

## 2024-06-23 RX ADMIN — MUPIROCIN: 20 OINTMENT TOPICAL at 09:06

## 2024-06-23 RX ADMIN — CALCIUM GLUCONATE 1 G: 98 INJECTION, SOLUTION INTRAVENOUS at 05:06

## 2024-06-23 RX ADMIN — SODIUM CHLORIDE 500 ML: 9 INJECTION, SOLUTION INTRAVENOUS at 05:06

## 2024-06-23 RX ADMIN — MIDAZOLAM HYDROCHLORIDE 5 MG: 1 INJECTION, SOLUTION INTRAMUSCULAR; INTRAVENOUS at 10:06

## 2024-06-23 RX ADMIN — NOREPINEPHRINE BITARTRATE 0.2 MCG/KG/MIN: 8 INJECTION, SOLUTION INTRAVENOUS at 07:06

## 2024-06-23 RX ADMIN — NOREPINEPHRINE BITARTRATE 0.5 MCG/KG/MIN: 8 INJECTION, SOLUTION INTRAVENOUS at 08:06

## 2024-06-23 RX ADMIN — NOREPINEPHRINE BITARTRATE 0.05 MCG/KG/MIN: 4 INJECTION, SOLUTION INTRAVENOUS at 05:06

## 2024-06-23 RX ADMIN — LORAZEPAM 2 MG: 2 INJECTION INTRAMUSCULAR; INTRAVENOUS at 07:06

## 2024-06-23 RX ADMIN — IPRATROPIUM BROMIDE AND ALBUTEROL SULFATE 3 ML: .5; 2.5 SOLUTION RESPIRATORY (INHALATION) at 07:06

## 2024-06-23 RX ADMIN — Medication 2500 MCG: at 11:06

## 2024-06-23 RX ADMIN — SODIUM CHLORIDE 500 ML: 9 INJECTION, SOLUTION INTRAVENOUS at 11:06

## 2024-06-23 RX ADMIN — ONDANSETRON 8 MG: 2 INJECTION INTRAMUSCULAR; INTRAVENOUS at 05:06

## 2024-06-23 NOTE — Clinical Note
Diagnosis: Calcium channel blocker overdose, intentional self-harm, initial encounter [[id number]]   Future Attending Provider: ANTONI PANTOJA [[id number]]   Reason for IP Medical Treatment  (Clinical interventions that can only be accomplished in the IP setting? ) :: vasopressors   I certify that Inpatient services for greater than or equal to 2 midnights are medically necessary:: Yes   Plans for Post-Acute care--if anticipated (pick the single best option):: A. No post acute care anticipated at this time   Special Needs:: No Special Needs [1]

## 2024-06-23 NOTE — ED NOTES
[id number]- case # for poison control (Treva) advised to support w IVF and pressors as needed and to call back for increasing pressor requirements as a  would need to be consulted.

## 2024-06-23 NOTE — ED PROVIDER NOTES
History     Chief Complaint   Patient presents with    Ingestion     Per EMS - pt intentionally ingested approximately 90 10mg Norvasc at 11pm last night in an apparent suicide attempt.      HPI:  Brian Helm is a 63 y.o. male with PMH as below who presents to the [facility] emergency department for evaluation of suicide attempt with massive overdose. Patient took 90 tablets of 10 mg of Norvasc last night at 11 pm (~ 6 h ago). Patient states he wanted to die. Denies any other substance OD. He arrives hypotensive 90s/50s with EMS.       PCP: No, Primary Doctor    Review of patient's allergies indicates:  No Known Allergies   Past Medical History:   Diagnosis Date    ETOH abuse     Hepatitis      Past Surgical History:   Procedure Laterality Date    TONSILLECTOMY         No family history on file.  Social History     Tobacco Use    Smoking status: Every Day     Current packs/day: 2.00     Types: Cigarettes    Smokeless tobacco: Never   Substance and Sexual Activity    Alcohol use: Yes     Comment: jimenez drinker     Drug use: Never    Sexual activity: Not Currently      Review of Systems     Review of Systems   Constitutional: Negative.  Negative for fever.   HENT: Negative.     Eyes: Negative.    Respiratory: Negative.     Cardiovascular: Negative.    Gastrointestinal: Negative.    Endocrine: Negative.    Genitourinary: Negative.    Musculoskeletal: Negative.    Skin: Negative.    Allergic/Immunologic: Negative.    Neurological: Negative.    Hematological: Negative.    Psychiatric/Behavioral: Negative.     All other systems reviewed and are negative.       Physical Exam     Initial Vitals [06/23/24 0524]   BP Pulse Resp Temp SpO2   90/63 (!) 114 16 97.7 °F (36.5 °C) 98 %      MAP       --          Nursing notes and vital signs reviewed.  Constitutional: Patient is in no distress. Appears fatigued.   Head: Normocephalic. Atraumatic.   Eyes:  Conjunctivae are not pale. No scleral icterus.   ENT: Mucous  membranes moist.   Neck: Supple.   Cardiovascular: Regular rate. Regular rhythm.   Pulmonary: No respiratory distress.   Abdominal: Non-distended.   Musculoskeletal: Moves all extremities. No obvious deformities.   Skin: Warm and dry.   Neurological:  Alert, awake. Long latency of speech and response. No acute lateralizing neurologic deficits appreciated.   Psychiatric: Normal affect.       ED Course   Critical Care    Date/Time: 6/23/2024 5:40 AM    Performed by: Francisco Wilson MD  Authorized by: Francisco Wilson MD  Direct patient critical care time: 15 minutes  Additional history critical care time: 5 minutes  Ordering / reviewing critical care time: 5 minutes  Documentation critical care time: 5 minutes  Consulting other physicians critical care time: 5 minutes  Total critical care time (exclusive of procedural time) : 35 minutes  Critical care time was exclusive of separately billable procedures and treating other patients and teaching time.  Critical care was necessary to treat or prevent imminent or life-threatening deterioration of the following conditions: toxidrome and shock.  Critical care was time spent personally by me on the following activities: blood draw for specimens, development of treatment plan with patient or surrogate, interpretation of cardiac output measurements, evaluation of patient's response to treatment, examination of patient, obtaining history from patient or surrogate, ordering and performing treatments and interventions, ordering and review of laboratory studies, ordering and review of radiographic studies, pulse oximetry, re-evaluation of patient's condition, review of old charts and vascular access procedures.      Central Line    Date/Time: 6/23/2024 5:55 AM    Performed by: Francisco Wilson MD  Authorized by: Francisco Wilson MD    Location procedure was performed:  [facility] EMERGENCY DEPARTMENT  Consent Done ?:  Emergent Situation  Time out complete?: Verified  correct patient, procedure, equipment, staff, and site/side    Indications:  Vascular access and med administration  Anesthesia:  See MAR for details  Preparation:  Skin prepped with ChloraPrep  Skin prep agent dried: Skin prep agent completely dried prior to procedure    Sterile barriers: All five maximal sterile barriers used - gloves, gown, cap, mask and large sterile sheet    Hand hygiene: Hand hygiene performed immediately prior to central venous catheter insertion    Location:  Left internal jugular  Catheter type:  Triple lumen  Catheter size:  7 Fr  Ultrasound guidance: Yes    Vessel Caliber:  Medium   patent  Comprressibility:  Normal  Needle advanced into vessel with real time ultrasound guidance.    Guidewire confirmed in vessel.    Steril sheath on probe.    Sterile gel used.  Manometry: No    Number of attempts:  1  Securement:  Line sutured, chlorhexidine patch, sterile dressing applied and blood return through all ports  Complications: No    Specimens: No    Implants: No    XRay:  Placement verified by x-ray and successful placement  Adverse Events:  NoneTermination Site: superior vena cava    Vitals:    06/23/24 1258 06/23/24 1322 06/23/24 1403 06/23/24 1407   BP: (!) 98/53 (!) 104/58 (!) 100/58 (!) 102/57   Pulse: 100 107 103 102   Resp: 19 17 17 (!) 23   Temp:       TempSrc:       SpO2: (!) 93% (!) 93% (!) 93% (!) 90%   Weight:       Height:        06/23/24 1412 06/23/24 1503 06/23/24 1547 06/23/24 1550   BP: (!) 107/58 (!) 101/54 (!) 97/52    Pulse: 109 104 100    Resp: (!) 27 16 17    Temp:       TempSrc:       SpO2: (!) 92% (!) 90% 95% (!) 94%   Weight:       Height:        06/23/24 1613 06/23/24 1645 06/23/24 1647 06/23/24 1652   BP: (!) 106/54  (!) 99/54    Pulse: 99 98 96    Resp: 16 (!) 25 19    Temp:       TempSrc:       SpO2: 95% 95% 95% 96%   Weight:       Height:        06/23/24 1654 06/23/24 1757 06/23/24 1807   BP:  (!) 100/59 (!) 109/59   Pulse:  102 102   Resp:  17 20   Temp:       TempSrc:      SpO2: 96% 95% 95%   Weight:      Height:        Lab Results Interpreted as Abnormal:  Labs Reviewed   CBC W/ AUTO DIFFERENTIAL - Abnormal; Notable for the following components:       Result Value    MCH 33.4 (*)     MCHC 36.6 (*)     MPV 8.4 (*)     All other components within normal limits   COMPREHENSIVE METABOLIC PANEL - Abnormal; Notable for the following components:    CO2 14 (*)     Glucose 163 (*)     Creatinine 1.7 (*)      (*)     ALT 83 (*)     eGFR 44.7 (*)     Anion Gap 19 (*)     All other components within normal limits   TSH - Abnormal; Notable for the following components:    TSH 5.273 (*)     All other components within normal limits   URINALYSIS, REFLEX TO URINE CULTURE - Abnormal; Notable for the following components:    Specific Gravity, UA <=1.005 (*)     All other components within normal limits    Narrative:     Preferred Collection Type->Urine, Clean Catch  Specimen Source->Urine   ALCOHOL,MEDICAL (ETHANOL) - Abnormal; Notable for the following components:    Alcohol, Serum 164 (*)     All other components within normal limits   ACETAMINOPHEN LEVEL - Abnormal; Notable for the following components:    Acetaminophen (Tylenol), Serum <3.0 (*)     All other components within normal limits   SALICYLATE LEVEL - Abnormal; Notable for the following components:    Salicylate Lvl <5.0 (*)     All other components within normal limits   COMPREHENSIVE METABOLIC PANEL - Abnormal; Notable for the following components:    Sodium 133 (*)     CO2 12 (*)     Glucose 219 (*)     Creatinine 2.7 (*)     Total Bilirubin 1.3 (*)     AST 66 (*)     ALT 64 (*)     eGFR 25.7 (*)     Anion Gap 18 (*)     All other components within normal limits   B-TYPE NATRIURETIC PEPTIDE - Abnormal; Notable for the following components:     (*)     All other components within normal limits   TROPONIN I - Abnormal; Notable for the following components:    Troponin I 0.142 (*)     All other components  within normal limits   URINALYSIS, REFLEX TO URINE CULTURE - Abnormal; Notable for the following components:    Appearance, UA Hazy (*)     Protein, UA 2+ (*)     Ketones, UA Trace (*)     Bilirubin (UA) 1+ (*)     Occult Blood UA 2+ (*)     All other components within normal limits    Narrative:     Preferred Collection Type->Urine, Clean Catch  Specimen Source->Urine   URINALYSIS MICROSCOPIC - Abnormal; Notable for the following components:    RBC, UA 18 (*)     Bacteria Moderate (*)     Granular Casts, UA 2 (*)     Amorphous, UA Many (*)     All other components within normal limits    Narrative:     Preferred Collection Type->Urine, Clean Catch  Specimen Source->Urine   ISTAT PROCEDURE - Abnormal; Notable for the following components:    POC PH 7.334 (*)     POC PCO2 21.9 (*)     POC HCO3 11.7 (*)     POC BE -14 (*)     All other components within normal limits   ISTAT PROCEDURE - Abnormal; Notable for the following components:    POC PCO2 21.6 (*)     POC PO2 52 (*)     POC HCO3 14.1 (*)     POC BE -10 (*)     All other components within normal limits   POCT GLUCOSE - Abnormal; Notable for the following components:    POCT Glucose 208 (*)     All other components within normal limits   CULTURE, BLOOD   DRUG SCREEN PANEL, URINE EMERGENCY    Narrative:     Preferred Collection Type->Urine, Clean Catch  Specimen Source->Urine   SARS-COV-2 RNA AMPLIFICATION, QUAL   T4, FREE   PROTIME-INR   APTT   COMPREHENSIVE METABOLIC PANEL   POCT GLUCOSE, HAND-HELD DEVICE      All Lab Results:  Results for orders placed or performed during the hospital encounter of 06/23/24   CBC auto differential   Result Value Ref Range    WBC 7.89 3.90 - 12.70 K/uL    RBC 4.64 4.60 - 6.20 M/uL    Hemoglobin 15.5 14.0 - 18.0 g/dL    Hematocrit 42.4 40.0 - 54.0 %    MCV 91 82 - 98 fL    MCH 33.4 (H) 27.0 - 31.0 pg    MCHC 36.6 (H) 32.0 - 36.0 g/dL    RDW 13.2 11.5 - 14.5 %    Platelets 215 150 - 450 K/uL    MPV 8.4 (L) 9.2 - 12.9 fL    Immature  Granulocytes 0.4 0.0 - 0.5 %    Gran # (ANC) 5.1 1.8 - 7.7 K/uL    Immature Grans (Abs) 0.03 0.00 - 0.04 K/uL    Lymph # 1.9 1.0 - 4.8 K/uL    Mono # 0.8 0.3 - 1.0 K/uL    Eos # 0.1 0.0 - 0.5 K/uL    Baso # 0.07 0.00 - 0.20 K/uL    nRBC 0 0 /100 WBC    Gran % 63.9 38.0 - 73.0 %    Lymph % 24.0 18.0 - 48.0 %    Mono % 9.9 4.0 - 15.0 %    Eosinophil % 0.9 0.0 - 8.0 %    Basophil % 0.9 0.0 - 1.9 %    Differential Method Automated    Comprehensive metabolic panel   Result Value Ref Range    Sodium 138 136 - 145 mmol/L    Potassium 3.7 3.5 - 5.1 mmol/L    Chloride 105 95 - 110 mmol/L    CO2 14 (L) 23 - 29 mmol/L    Glucose 163 (H) 70 - 110 mg/dL    BUN 11 8 - 23 mg/dL    Creatinine 1.7 (H) 0.5 - 1.4 mg/dL    Calcium 9.0 8.7 - 10.5 mg/dL    Total Protein 7.5 6.0 - 8.4 g/dL    Albumin 4.1 3.5 - 5.2 g/dL    Total Bilirubin 0.9 0.1 - 1.0 mg/dL    Alkaline Phosphatase 108 55 - 135 U/L     (H) 10 - 40 U/L    ALT 83 (H) 10 - 44 U/L    eGFR 44.7 (A) >60 mL/min/1.73 m^2    Anion Gap 19 (H) 8 - 16 mmol/L   TSH   Result Value Ref Range    TSH 5.273 (H) 0.400 - 4.000 uIU/mL   Urinalysis, Reflex to Urine Culture Urine, Clean Catch    Specimen: Urine, Catheterized   Result Value Ref Range    Specimen UA Urine, Unspecified     Color, UA Yellow Yellow, Straw, [place]    Appearance, UA Clear Clear    pH, UA 6.0 5.0 - 8.0    Specific Gravity, UA <=1.005 (A) 1.005 - 1.030    Protein, UA Negative Negative    Glucose, UA Negative Negative    Ketones, UA Negative Negative    Bilirubin (UA) Negative Negative    Occult Blood UA Negative Negative    Nitrite, UA Negative Negative    Urobilinogen, UA Negative Negative EU/dL    Leukocytes, UA Negative Negative   Drug screen panel, emergency   Result Value Ref Range    Benzodiazepines Negative Negative    Methadone metabolites Negative Negative    Cocaine (Metab.) Negative Negative    Opiate Scrn, Ur Negative Negative    Barbiturate Screen, Ur Negative Negative    Amphetamine Screen, Ur  Negative Negative    THC Negative Negative    Phencyclidine Negative Negative    Creatinine, Urine 25.6 23.0 - 375.0 mg/dL    Toxicology Information SEE COMMENT    Ethanol   Result Value Ref Range    Alcohol, Serum 164 (H) 0 - 10 mg/dL   Acetaminophen level   Result Value Ref Range    Acetaminophen (Tylenol), Serum <3.0 (L) 10.0 - 20.0 ug/mL   COVID-19 Rapid Screening   Result Value Ref Range    SARS-CoV-2 RNA, Amplification, Qual Negative Negative   Salicylate level   Result Value Ref Range    Salicylate Lvl <5.0 (L) 15.0 - 30.0 mg/dL   T4, Free   Result Value Ref Range    Free T4 1.05 0.71 - 1.51 ng/dL   Comprehensive metabolic panel   Result Value Ref Range    Sodium 133 (L) 136 - 145 mmol/L    Potassium 4.2 3.5 - 5.1 mmol/L    Chloride 103 95 - 110 mmol/L    CO2 12 (L) 23 - 29 mmol/L    Glucose 219 (H) 70 - 110 mg/dL    BUN 12 8 - 23 mg/dL    Creatinine 2.7 (H) 0.5 - 1.4 mg/dL    Calcium 9.1 8.7 - 10.5 mg/dL    Total Protein 6.8 6.0 - 8.4 g/dL    Albumin 3.7 3.5 - 5.2 g/dL    Total Bilirubin 1.3 (H) 0.1 - 1.0 mg/dL    Alkaline Phosphatase 97 55 - 135 U/L    AST 66 (H) 10 - 40 U/L    ALT 64 (H) 10 - 44 U/L    eGFR 25.7 (A) >60 mL/min/1.73 m^2    Anion Gap 18 (H) 8 - 16 mmol/L   Brain natriuretic peptide   Result Value Ref Range     (H) 0 - 99 pg/mL   Troponin I   Result Value Ref Range    Troponin I 0.142 (H) 0.000 - 0.026 ng/mL   PT/INR   Result Value Ref Range    Prothrombin Time 12.4 9.0 - 12.5 sec    INR 1.2 0.8 - 1.2   PTT   Result Value Ref Range    aPTT 28.0 21.0 - 32.0 sec   Urinalysis, Reflex to Urine Culture Urine, Clean Catch    Specimen: Urine   Result Value Ref Range    Specimen UA Urine, Catheterized     Color, UA Yellow Yellow, Straw, [place]    Appearance, UA Hazy (A) Clear    pH, UA 5.0 5.0 - 8.0    Specific Gravity, UA 1.015 1.005 - 1.030    Protein, UA 2+ (A) Negative    Glucose, UA Negative Negative    Ketones, UA Trace (A) Negative    Bilirubin (UA) 1+ (A) Negative    Occult Blood UA 2+  (A) Negative    Nitrite, UA Negative Negative    Urobilinogen, UA Negative Negative EU/dL    Leukocytes, UA Negative Negative   Urinalysis Microscopic   Result Value Ref Range    RBC, UA 18 (H) 0 - 4 /hpf    WBC, UA 3 0 - 5 /hpf    Bacteria Moderate (A) None-Occ /hpf    Squam Epithel, UA 2 /hpf    Hyaline Casts, UA 0 0-1/lpf /lpf    Granular Casts, UA 2 (A) None /lpf    Amorphous, UA Many (A) None-Moderate    Microscopic Comment SEE COMMENT    ISTAT PROCEDURE   Result Value Ref Range    POC PH 7.334 (L) 7.35 - 7.45    POC PCO2 21.9 (LL) 35 - 45 mmHg    POC PO2 99 80 - 100 mmHg    POC HCO3 11.7 (L) 24 - 28 mmol/L    POC BE -14 (L) -2 to 2 mmol/L    POC SATURATED O2 97 95 - 100 %    Sample ARTERIAL     Site LR     Allens Test Pass     DelSys Nasal Can     Mode SPONT     Flow 3     FiO2 32    ISTAT PROCEDURE   Result Value Ref Range    POC PH 7.422 7.35 - 7.45    POC PCO2 21.6 (LL) 35 - 45 mmHg    POC PO2 52 (LL) 80 - 100 mmHg    POC HCO3 14.1 (L) 24 - 28 mmol/L    POC BE -10 (L) -2 to 2 mmol/L    POC SATURATED O2 88 95 - 100 %    Sample ARTERIAL     Site LR     Allens Test Pass     DelSys Nasal Can     Mode SPONT     Flow 5     FiO2 40    POCT glucose   Result Value Ref Range    POCT Glucose 208 (H) 70 - 110 mg/dL     Imaging Results              X-Ray Chest AP Portable (Final result)  Result time 06/23/24 15:35:35      Final result by Jami Hopkins MD (06/23/24 15:35:35)                   Impression:      No acute cardiopulmonary disease.      Electronically signed by: Jami Hopkins  Date:    06/23/2024  Time:    15:35               Narrative:    EXAMINATION:  XR CHEST AP PORTABLE    CLINICAL HISTORY:  sob;    TECHNIQUE:  Single frontal view of the chest was performed.    COMPARISON:  Film earlier today    FINDINGS:  A left IJ central venous catheter with tip projecting over the left innominate vein unchanged.  Cardiomediastinal silhouette within normal limits.  Lungs remain clear.  No pleural  effusion.                                       X-Ray Chest AP Portable (Final result)  Result time 06/23/24 12:23:11      Final result by Asa Reza MD (06/23/24 12:23:11)                   Impression:      No acute chest disease.      Electronically signed by: Asa Reza  Date:    06/23/2024  Time:    12:23               Narrative:    EXAMINATION:  XR CHEST AP PORTABLE    CLINICAL HISTORY:  Encounter for adjustment and management of vascular access device    TECHNIQUE:  Portable view of the chest was performed.    COMPARISON:  None.    FINDINGS:  Lungs are clear.  No focal consolidation.  Heart size normal.  Mediastinal contours unremarkable.  Trachea midline.    Bony thorax intact.  Placement of a left IJ catheter which terminates in the proximal SVC.                                     ED Physician's independent review of the above imaging: satisfactory left IJ CVL placement.    The EKG was ordered, reviewed, and independently interpreted by the ED Physician:  Rhythm: sinus tachycardia   Rate: 113 bpm  No ST-T changes concerning for acute ischemia  Normal axis   Normal intervals     The emergency physician reviewed the vital signs and test results, which are outlined above.     ED Discussion      Patient is out of GI decontamination window.     State toxicology was consulted and recommended fluids, IV calcium, vasopressor support; high-dose insulin and lipid emulsion not recommended.     They should be re-consulted with clinical changes and should be actively following this patient's case.       ED Medication(s) Administered:  Medications   lactated ringers bolus 1,000 mL (1,000 mLs Intravenous Not Given 6/23/24 0700)   mupirocin 2 % ointment ( Nasal Given 6/23/24 0949)   NORepinephrine 8 mg in dextrose 5% 250 mL infusion (0.2 mcg/kg/min × 95.3 kg Intravenous New Bag 6/23/24 1533)   LORazepam (ATIVAN) injection 2 mg (2 mg Intravenous Given 6/23/24 1207)   LORazepam (ATIVAN) 2 mg/mL injection (has no  administration in time range)   naloxone 0.4 mg/mL injection 0.02 mg (has no administration in time range)   glucagon (human recombinant) injection 1 mg (has no administration in time range)   potassium bicarbonate disintegrating tablet 50 mEq (has no administration in time range)   potassium bicarbonate disintegrating tablet 40 mEq (has no administration in time range)   potassium bicarbonate disintegrating tablet 60 mEq (has no administration in time range)   magnesium oxide tablet 800 mg (has no administration in time range)   magnesium oxide tablet 800 mg (has no administration in time range)   potassium, sodium phosphates 280-160-250 mg packet 2 packet (has no administration in time range)   potassium, sodium phosphates 280-160-250 mg packet 2 packet (has no administration in time range)   potassium, sodium phosphates 280-160-250 mg packet 2 packet (has no administration in time range)   acetaminophen tablet 650 mg (has no administration in time range)   morphine injection 2 mg (has no administration in time range)   polyethylene glycol packet 17 g (has no administration in time range)   ondansetron injection 4 mg (has no administration in time range)   prochlorperazine injection Soln 5 mg (has no administration in time range)   melatonin tablet 6 mg (has no administration in time range)   simethicone chewable tablet 80 mg (has no administration in time range)   aluminum-magnesium hydroxide-simethicone 200-200-20 mg/5 mL suspension 30 mL (has no administration in time range)   albuterol-ipratropium 2.5 mg-0.5 mg/3 mL nebulizer solution 3 mL (3 mLs Nebulization Given 6/23/24 4686)   calcium gluconate 100 mg/mL (10%) injection 1 g (1 g Intravenous Given 6/23/24 6274)   lactated ringers bolus 1,000 mL (0 mLs Intravenous Stopped 6/23/24 0719)   ondansetron injection 8 mg (8 mg Intravenous Given 6/23/24 5366)   lactated ringers bolus 1,000 mL (0 mLs Intravenous Stopped 6/23/24 0824)   promethazine (PHENERGAN) 12.5  mg in 0.9% NaCl 50 mL IVPB (0 mg Intravenous Stopped 6/23/24 0732)   calcium chloride 1 g in D5W 100 mL IVPB (0 g Intravenous Stopped 6/23/24 0857)   0.9%  NaCl infusion (0 mLs Intravenous Stopped 6/23/24 1038)   0.9%  NaCl infusion (0 mLs Intravenous Stopped 6/23/24 1618)   0.9%  NaCl infusion (0 mLs Intravenous Stopped 6/23/24 1745)   0.9%  NaCl infusion (500 mLs Intravenous New Bag 6/23/24 1752)       Prescription Management: I performed a review of the patient's current Rx medication list as input by nursing staff.    Patient's Medications   New Prescriptions    No medications on file   Previous Medications    HYDROXYZINE PAMOATE (VISTARIL) 25 MG CAP    Take 2 capsules (50 mg total) by mouth 4 (four) times daily.   Modified Medications    No medications on file   Discontinued Medications    No medications on file           Clinical Impression       ICD-10-CM ICD-9-CM   1. Calcium channel blocker overdose, intentional self-harm, initial encounter  T46.1X2A 972.9     E950.4   2. Overdose  T50.901A 977.9     E980.5   3. Encounter for central line placement  Z45.2 V58.81   4. Chest pain  R07.9 786.50   5. CHF (congestive heart failure)  I50.9 428.0   6. Non-ST elevation MI (NSTEMI)  I21.4 410.70   7. Drug-induced hypotension  I95.2 458.8     E947.9      ED Disposition Condition    Admit Serious             Francisco Wilson MD  06/23/24 8921

## 2024-06-23 NOTE — HPI
Patient is a 63-year-old male that presented after having taken an unknown number of his Norvasc, amlodipine 10 mg tablets.  Patient was attempting to kill himself.  Patient desire remains to hurt himself when asked at my history.  Patient states multiple stressors in his life and he had just come to the point of not being able to withstand them anymore.  Patient has a past history of hypertension and treated with Norvasc.  Patient also has a history of alcohol abuse which he states he was drinking as well at the time of taking the medication.

## 2024-06-23 NOTE — CARE UPDATE
Patient is a 63-year-old that admitted for suicide attempt with taking an unknown number of amlodipine 10 mg tablets.  It was estimated initially he took 90 10 mg tablets.  Patient has shown very little urine output today.  Patient has been given 500 cc boluses of normal saline.  Initial creatinine this morning was 1.7.  Repeat creatinine this afternoon was 2.7.  Chest x-ray is showing no acute process.  ABG shows a PO2 of 55 with normal pH.  O2 saturation on 10 L nasal cannula high-flow is 93-94%.  BNP was normal.  Echocardiogram is pending.  Troponin level was normal on admission and 0.14 currently.    Plan:  We will start on heparin infusion by protocol  If creatinine improves to normal we will obtain CTA of the chest for PE protocol  If creatinine is prohibitive we will obtain V/Q scan.  Heparin protocol will be started for empiric therapy and also for possible non ST-elevation MI. continue to trend troponin levels  EKG revealed a normal sinus rhythm with no STT wave changes to suggest ischemia.  Troponin elevation may be stress related or volume depletion.  We will continue gentle hydration due to the likelihood of increase creatinine being prerenal and lack of perfusion.  We will follow otherwise as needed

## 2024-06-23 NOTE — H&P
[facility]  History & Physical    Patient Name: Brian Helm  MRN: [id number]  Patient Class: IP- Inpatient  Admission Date: 6/23/2024  Attending Physician: Toi Silva MD   Primary Care Provider: Rebekah Primary Doctor         Patient information was obtained from patient and ER records.     Subjective:     Principal Problem:<principal problem not specified>    Chief Complaint:   Chief Complaint   Patient presents with    Ingestion     Per EMS - pt intentionally ingested approximately 90 10mg Norvasc at 11pm last night in an apparent suicide attempt.         HPI: Patient is a 63-year-old male that presented after having taken an unknown number of his Norvasc, amlodipine 10 mg tablets.  Patient was attempting to kill himself.  Patient desire remains to hurt himself when asked at my history.  Patient states multiple stressors in his life and he had just come to the point of not being able to withstand them anymore.  Patient has a past history of hypertension and treated with Norvasc.  Patient also has a history of alcohol abuse which he states he was drinking as well at the time of taking the medication.    Past Medical History:   Diagnosis Date    ETOH abuse     Hepatitis        Past Surgical History:   Procedure Laterality Date    TONSILLECTOMY         Review of patient's allergies indicates:  No Known Allergies    No current facility-administered medications on file prior to encounter.     Current Outpatient Medications on File Prior to Encounter   Medication Sig    hydrOXYzine pamoate (VISTARIL) 25 MG Cap Take 2 capsules (50 mg total) by mouth 4 (four) times daily.     Family History    None       Tobacco Use    Smoking status: Every Day     Current packs/day: 2.00     Types: Cigarettes    Smokeless tobacco: Never   Substance and Sexual Activity    Alcohol use: Yes     Comment: barbara drinker     Drug use: Never    Sexual activity: Not Currently     Review of Systems    Constitutional:  Negative for activity change, appetite change, fatigue and fever.   HENT:  Negative for congestion, ear discharge, mouth sores, nosebleeds, rhinorrhea, sinus pressure, sinus pain and tinnitus.    Eyes: Negative.  Negative for pain, redness and itching.   Respiratory:  Positive for cough, chest tightness and shortness of breath. Negative for apnea, choking, wheezing and stridor.    Cardiovascular:  Positive for palpitations. Negative for chest pain and leg swelling.   Gastrointestinal:  Negative for abdominal distention, abdominal pain, anal bleeding, blood in stool, constipation and diarrhea.   Endocrine: Negative.    Genitourinary:  Negative for difficulty urinating, flank pain, frequency and urgency.   Musculoskeletal:  Positive for arthralgias and myalgias. Negative for back pain and gait problem.   Skin:  Positive for pallor. Negative for color change.   Allergic/Immunologic: Negative.    Neurological:  Positive for dizziness and weakness. Negative for facial asymmetry, light-headedness and headaches.   Hematological:  Negative for adenopathy. Does not bruise/bleed easily.   Psychiatric/Behavioral:  The patient is nervous/anxious.    All other systems reviewed and are negative.    Objective:     Vital Signs (Most Recent):  Temp: 98 °F (36.7 °C) (06/23/24 0529)  Pulse: 104 (06/23/24 1503)  Resp: 16 (06/23/24 1503)  BP: (!) 101/54 (06/23/24 1503)  SpO2: (!) 90 % (06/23/24 1503) Vital Signs (24h Range):  Temp:  [97.7 °F (36.5 °C)-98 °F (36.7 °C)] 98 °F (36.7 °C)  Pulse:  [] 104  Resp:  [16-46] 16  SpO2:  [90 %-100 %] 90 %  BP: ()/(44-63) 101/54     Weight: 95.3 kg (210 lb)  Body mass index is 28.48 kg/m².     Physical Exam  Vitals and nursing note reviewed.   Constitutional:       General: He is in acute distress.      Appearance: He is well-developed. He is ill-appearing.   HENT:      Head: Normocephalic and atraumatic.      Nose: Nose normal.      Mouth/Throat:      Mouth: Mucous  membranes are moist.   Eyes:      Pupils: Pupils are equal, round, and reactive to light.   Neck:      Thyroid: No thyromegaly.      Trachea: No tracheal deviation.   Cardiovascular:      Rate and Rhythm: Normal rate and regular rhythm.      Heart sounds: Normal heart sounds.   Pulmonary:      Effort: Respiratory distress present.      Breath sounds: Rhonchi and rales present.   Abdominal:      General: Bowel sounds are normal. There is no distension.      Palpations: Abdomen is soft.      Tenderness: There is no guarding or rebound.   Musculoskeletal:         General: Normal range of motion.      Cervical back: Normal range of motion and neck supple.   Lymphadenopathy:      Cervical: No cervical adenopathy.   Skin:     General: Skin is warm and dry.      Capillary Refill: Capillary refill takes less than 2 seconds.   Neurological:      General: No focal deficit present.      Mental Status: He is alert and oriented to person, place, and time.      Motor: Weakness present.   Psychiatric:         Behavior: Behavior normal.         Thought Content: Thought content normal.         Judgment: Judgment normal.              CRANIAL NERVES     CN III, IV, VI   Pupils are equal, round, and reactive to light.       Significant Labs: All pertinent labs within the past 24 hours have been reviewed.  Recent Lab Results  (Last 5 results in the past 24 hours)        06/23/24  1227   06/23/24  0657   06/23/24  0555   06/23/24  0554   06/23/24  0531        Benzodiazepines     Negative           Methadone metabolites     Negative           Phencyclidine     Negative           Acetaminophen Level         <3.0  Comment: Toxic Levels:  Adults (4 hr post-ingestion).........>150 ug/mL  Adults (12 hr post-ingestion)........>40 ug/mL  Peds (2 hr post-ingestion, liquid)...>225 ug/mL         Albumin 3.7         4.1       Alcohol, Serum         164       ALP 97         108       Allens Test   Pass             ALT 64         83        Amphetamines, Urine     Negative           Anion Gap 18         19       Appearance, UA       Clear         AST 66         116       Barbituates, Urine     Negative           Baso #         0.07       Basophil %         0.9       Bilirubin (UA)       Negative         BILIRUBIN TOTAL 1.3  Comment: For infants and newborns, interpretation of results should be based  on gestational age, weight and in agreement with clinical  observations.    Premature Infant recommended reference ranges:  Up to 24 hours.............<8.0 mg/dL  Up to 48 hours............<12.0 mg/dL  3-5 days..................<15.0 mg/dL  6-29 days.................<15.0 mg/dL           0.9  Comment: For infants and newborns, interpretation of results should be based  on gestational age, weight and in agreement with clinical  observations.    Premature Infant recommended reference ranges:  Up to 24 hours.............<8.0 mg/dL  Up to 48 hours............<12.0 mg/dL  3-5 days..................<15.0 mg/dL  6-29 days.................<15.0 mg/dL         Site   LR             BUN 12         11       Calcium 9.1         9.0       Chloride 103         105       CO2 12         14       Cocaine, Urine     Negative           Color, UA       Yellow         Creatinine 2.7         1.7       Urine Creatinine     25.6           DelSys   Nasal Can             Differential Method         Automated       eGFR 25.7         44.7       Eos #         0.1       Eos %         0.9       FiO2   32             Flow   3             Free T4         1.05       Glucose 219         163       Glucose, UA       Negative         Gran # (ANC)         5.1       Gran %         63.9       Hematocrit         42.4       Hemoglobin         15.5       Immature Grans (Abs)         0.03  Comment: Mild elevation in immature granulocytes is non specific and   can be seen in a variety of conditions including stress response,   acute inflammation, trauma and pregnancy. Correlation with other    laboratory and clinical findings is essential.         Immature Granulocytes         0.4       Ketones, UA       Negative         Leukocyte Esterase, UA       Negative         Lymph #         1.9       Lymph %         24.0       MCH         33.4       MCHC         36.6       MCV         91       Mode   SPONT             Mono #         0.8       Mono %         9.9       MPV         8.4       NITRITE UA       Negative         nRBC         0       Blood, UA       Negative         Opiates, Urine     Negative           pH, UA       6.0         Platelet Count         215       POC BE   -14             POC HCO3   11.7             POC PCO2   21.9             POC PH   7.334             POC PO2   99             POC SATURATED O2   97             Potassium 4.2         3.7       PROTEIN TOTAL 6.8         7.5       Protein, UA       Negative  Comment: Recommend a 24 hour urine protein or a urine   protein/creatinine ratio if globulin induced proteinuria is  clinically suspected.           RBC         4.64       RDW         13.2       Salicylate Level         <5.0  Comment: Toxic:  30.0 - 70.0 mg/dl  Lethal: >70.0 mg/dl         Sample   ARTERIAL             SARS-CoV-2 RNA, Amplification, Qual         Negative  Comment: This test utilizes isothermal nucleic acid amplification technology   to   detect the SARS-CoV-2 RdRp nucleic acid segment. The analytical   sensitivity   (limit of detection) is 500 copies/swab.    A POSITIVE result is indicative of the presence of SARS-CoV-2 RNA;   clinical   correlation with patient history and other diagnostic information is   necessary to determine patient infection status.    A NEGATIVE result means that SARS-CoV-2 nucleic acids are not present   above   the limit of detection. A NEGATIVE result should be treated as   presumptive.   It does not rule out the possibility of COVID-19 and should not be   the sole   basis for treatment decisions.    If COVID-19 is strongly suspected based on  clinical and exposure   history,   re-testing using an alternate molecular assay should be considered.    This test is Food and Drug Administration (FDA) approved. Performance   characteristics of this has been independently verified by [facility] Department of Pathology and Laboratory Medicine.         Sodium 133         138       Spec Grav UA       <=1.005         Specimen UA       Urine, Unspecified         Marijuana (THC) Metabolite     Negative           Toxicology Information     SEE COMMENT  Comment: This screen includes the following classes of drugs at the listed   cut-off:    Benzodiazepines 200 ng/ml  Methadone 300 ng/ml  Cocaine metabolite 300 ng/ml  Opiates 300 ng/ml  Barbiturates 200 ng/ml  Amphetamines 1000 ng/ml  Marijuana metabs (THC) 50 ng/ml  Phencyclidine (PCP) 25 ng/ml    This is a screening test. If results do not correlate with clinical   presentation, then a confirmatory send out test is advised.     This report is intended for use in clinical monitoring and management   of   patients. It is not intended for use in employment related drug   testing.             TSH         5.273       UROBILINOGEN UA       Negative         WBC         7.89                              Significant Imaging: I have reviewed all pertinent imaging results/findings within the past 24 hours.  Assessment/Plan:     Alcohol abuse  Monitor for DT's  DT precautions  Sedation as need with Ativan for Withdrawal agitations.        Acute respiratory distress  Begin Nebulizer treatment to optimize airway  Oxygen supplementation as needed,  Now on High flow 10L  Follow serial chest xrays  ECHOcardiogram in AM  Telemetry  Begin empiric antibiotics for possible aspiration      Intentional overdose of calcium-channel blocker  Monitor telemetry  Continue current monitoring  Poison Control recommendations          VTE Risk Mitigation (From admission, onward)           Ordered     IP VTE LOW RISK PATIENT  Once          06/23/24 1515     Place sequential compression device  Until discontinued         06/23/24 1515                                    Toi Silva MD  Department of [facility]  [place] - Emergency Dept

## 2024-06-23 NOTE — ASSESSMENT & PLAN NOTE
Begin Nebulizer treatment to optimize airway  Oxygen supplementation as needed,  Now on High flow 10L  Follow serial chest xrays  ECHOcardiogram in AM  Telemetry  Begin empiric antibiotics for possible aspiration

## 2024-06-23 NOTE — ED NOTES
Spoke with poison control requesting consult with toxicology for worsening renal function. Awaiting call back.

## 2024-06-23 NOTE — SUBJECTIVE & OBJECTIVE
Past Medical History:   Diagnosis Date    ETOH abuse     Hepatitis        Past Surgical History:   Procedure Laterality Date    TONSILLECTOMY         Review of patient's allergies indicates:  No Known Allergies    No current facility-administered medications on file prior to encounter.     Current Outpatient Medications on File Prior to Encounter   Medication Sig    hydrOXYzine pamoate (VISTARIL) 25 MG Cap Take 2 capsules (50 mg total) by mouth 4 (four) times daily.     Family History    None       Tobacco Use    Smoking status: Every Day     Current packs/day: 2.00     Types: Cigarettes    Smokeless tobacco: Never   Substance and Sexual Activity    Alcohol use: Yes     Comment: jimenez drinker     Drug use: Never    Sexual activity: Not Currently     Review of Systems   Constitutional:  Negative for activity change, appetite change, fatigue and fever.   HENT:  Negative for congestion, ear discharge, mouth sores, nosebleeds, rhinorrhea, sinus pressure, sinus pain and tinnitus.    Eyes: Negative.  Negative for pain, redness and itching.   Respiratory:  Positive for cough, chest tightness and shortness of breath. Negative for apnea, choking, wheezing and stridor.    Cardiovascular:  Positive for palpitations. Negative for chest pain and leg swelling.   Gastrointestinal:  Negative for abdominal distention, abdominal pain, anal bleeding, blood in stool, constipation and diarrhea.   Endocrine: Negative.    Genitourinary:  Negative for difficulty urinating, flank pain, frequency and urgency.   Musculoskeletal:  Positive for arthralgias and myalgias. Negative for back pain and gait problem.   Skin:  Positive for pallor. Negative for color change.   Allergic/Immunologic: Negative.    Neurological:  Positive for dizziness and weakness. Negative for facial asymmetry, light-headedness and headaches.   Hematological:  Negative for adenopathy. Does not bruise/bleed easily.   Psychiatric/Behavioral:  The patient is  nervous/anxious.    All other systems reviewed and are negative.    Objective:     Vital Signs (Most Recent):  Temp: 98 °F (36.7 °C) (06/23/24 0529)  Pulse: 104 (06/23/24 1503)  Resp: 16 (06/23/24 1503)  BP: (!) 101/54 (06/23/24 1503)  SpO2: (!) 90 % (06/23/24 1503) Vital Signs (24h Range):  Temp:  [97.7 °F (36.5 °C)-98 °F (36.7 °C)] 98 °F (36.7 °C)  Pulse:  [] 104  Resp:  [16-46] 16  SpO2:  [90 %-100 %] 90 %  BP: ()/(44-63) 101/54     Weight: 95.3 kg (210 lb)  Body mass index is 28.48 kg/m².     Physical Exam  Vitals and nursing note reviewed.   Constitutional:       General: He is in acute distress.      Appearance: He is well-developed. He is ill-appearing.   HENT:      Head: Normocephalic and atraumatic.      Nose: Nose normal.      Mouth/Throat:      Mouth: Mucous membranes are moist.   Eyes:      Pupils: Pupils are equal, round, and reactive to light.   Neck:      Thyroid: No thyromegaly.      Trachea: No tracheal deviation.   Cardiovascular:      Rate and Rhythm: Normal rate and regular rhythm.      Heart sounds: Normal heart sounds.   Pulmonary:      Effort: Respiratory distress present.      Breath sounds: Rhonchi and rales present.   Abdominal:      General: Bowel sounds are normal. There is no distension.      Palpations: Abdomen is soft.      Tenderness: There is no guarding or rebound.   Musculoskeletal:         General: Normal range of motion.      Cervical back: Normal range of motion and neck supple.   Lymphadenopathy:      Cervical: No cervical adenopathy.   Skin:     General: Skin is warm and dry.      Capillary Refill: Capillary refill takes less than 2 seconds.   Neurological:      General: No focal deficit present.      Mental Status: He is alert and oriented to person, place, and time.      Motor: Weakness present.   Psychiatric:         Behavior: Behavior normal.         Thought Content: Thought content normal.         Judgment: Judgment normal.              CRANIAL NERVES     CN  III, IV, VI   Pupils are equal, round, and reactive to light.       Significant Labs: All pertinent labs within the past 24 hours have been reviewed.  Recent Lab Results  (Last 5 results in the past 24 hours)        06/23/24  1227   06/23/24  0657   06/23/24  0555   06/23/24  0554   06/23/24  0531        Benzodiazepines     Negative           Methadone metabolites     Negative           Phencyclidine     Negative           Acetaminophen Level         <3.0  Comment: Toxic Levels:  Adults (4 hr post-ingestion).........>150 ug/mL  Adults (12 hr post-ingestion)........>40 ug/mL  Peds (2 hr post-ingestion, liquid)...>225 ug/mL         Albumin 3.7         4.1       Alcohol, Serum         164       ALP 97         108       Allens Test   Pass             ALT 64         83       Amphetamines, Urine     Negative           Anion Gap 18         19       Appearance, UA       Clear         AST 66         116       Barbituates, Urine     Negative           Baso #         0.07       Basophil %         0.9       Bilirubin (UA)       Negative         BILIRUBIN TOTAL 1.3  Comment: For infants and newborns, interpretation of results should be based  on gestational age, weight and in agreement with clinical  observations.    Premature Infant recommended reference ranges:  Up to 24 hours.............<8.0 mg/dL  Up to 48 hours............<12.0 mg/dL  3-5 days..................<15.0 mg/dL  6-29 days.................<15.0 mg/dL           0.9  Comment: For infants and newborns, interpretation of results should be based  on gestational age, weight and in agreement with clinical  observations.    Premature Infant recommended reference ranges:  Up to 24 hours.............<8.0 mg/dL  Up to 48 hours............<12.0 mg/dL  3-5 days..................<15.0 mg/dL  6-29 days.................<15.0 mg/dL         Site   LR             BUN 12         11       Calcium 9.1         9.0       Chloride 103         105       CO2 12         14       Cocaine,  Urine     Negative           Color, UA       Yellow         Creatinine 2.7         1.7       Urine Creatinine     25.6           DelSys   Nasal Can             Differential Method         Automated       eGFR 25.7         44.7       Eos #         0.1       Eos %         0.9       FiO2   32             Flow   3             Free T4         1.05       Glucose 219         163       Glucose, UA       Negative         Gran # (ANC)         5.1       Gran %         63.9       Hematocrit         42.4       Hemoglobin         15.5       Immature Grans (Abs)         0.03  Comment: Mild elevation in immature granulocytes is non specific and   can be seen in a variety of conditions including stress response,   acute inflammation, trauma and pregnancy. Correlation with other   laboratory and clinical findings is essential.         Immature Granulocytes         0.4       Ketones, UA       Negative         Leukocyte Esterase, UA       Negative         Lymph #         1.9       Lymph %         24.0       MCH         33.4       MCHC         36.6       MCV         91       Mode   SPONT             Mono #         0.8       Mono %         9.9       MPV         8.4       NITRITE UA       Negative         nRBC         0       Blood, UA       Negative         Opiates, Urine     Negative           pH, UA       6.0         Platelet Count         215       POC BE   -14             POC HCO3   11.7             POC PCO2   21.9             POC PH   7.334             POC PO2   99             POC SATURATED O2   97             Potassium 4.2         3.7       PROTEIN TOTAL 6.8         7.5       Protein, UA       Negative  Comment: Recommend a 24 hour urine protein or a urine   protein/creatinine ratio if globulin induced proteinuria is  clinically suspected.           RBC         4.64       RDW         13.2       Salicylate Level         <5.0  Comment: Toxic:  30.0 - 70.0 mg/dl  Lethal: >70.0 mg/dl         Sample   ARTERIAL             SARS-CoV-2  RNA, Amplification, Qual         Negative  Comment: This test utilizes isothermal nucleic acid amplification technology   to   detect the SARS-CoV-2 RdRp nucleic acid segment. The analytical   sensitivity   (limit of detection) is 500 copies/swab.    A POSITIVE result is indicative of the presence of SARS-CoV-2 RNA;   clinical   correlation with patient history and other diagnostic information is   necessary to determine patient infection status.    A NEGATIVE result means that SARS-CoV-2 nucleic acids are not present   above   the limit of detection. A NEGATIVE result should be treated as   presumptive.   It does not rule out the possibility of COVID-19 and should not be   the sole   basis for treatment decisions.    If COVID-19 is strongly suspected based on clinical and exposure   history,   re-testing using an alternate molecular assay should be considered.    This test is Food and Drug Administration (FDA) approved. Performance   characteristics of this has been independently verified by [facility] Department of Pathology and Laboratory Medicine.         Sodium 133         138       Spec Grav UA       <=1.005         Specimen UA       Urine, Unspecified         Marijuana (THC) Metabolite     Negative           Toxicology Information     SEE COMMENT  Comment: This screen includes the following classes of drugs at the listed   cut-off:    Benzodiazepines 200 ng/ml  Methadone 300 ng/ml  Cocaine metabolite 300 ng/ml  Opiates 300 ng/ml  Barbiturates 200 ng/ml  Amphetamines 1000 ng/ml  Marijuana metabs (THC) 50 ng/ml  Phencyclidine (PCP) 25 ng/ml    This is a screening test. If results do not correlate with clinical   presentation, then a confirmatory send out test is advised.     This report is intended for use in clinical monitoring and management   of   patients. It is not intended for use in employment related drug   testing.             TSH         5.273       UROBILINOGEN UA       Negative          WBC         7.89                              Significant Imaging: I have reviewed all pertinent imaging results/findings within the past 24 hours.

## 2024-06-24 PROBLEM — N17.0 ATN (ACUTE TUBULAR NECROSIS): Status: ACTIVE | Noted: 2024-06-24

## 2024-06-24 PROBLEM — R79.89 ELEVATED TROPONIN: Status: ACTIVE | Noted: 2024-06-24

## 2024-06-24 PROBLEM — I95.2 HYPOTENSION DUE TO DRUGS: Status: ACTIVE | Noted: 2024-06-24

## 2024-06-24 PROBLEM — T14.91XA SUICIDAL BEHAVIOR WITH ATTEMPTED SELF-INJURY: Status: ACTIVE | Noted: 2024-06-24

## 2024-06-24 PROBLEM — E87.20 METABOLIC ACIDOSIS: Status: ACTIVE | Noted: 2024-06-24

## 2024-06-24 PROBLEM — R73.9 HYPERGLYCEMIA: Status: ACTIVE | Noted: 2024-06-24

## 2024-06-24 PROBLEM — R57.9 SHOCK: Status: ACTIVE | Noted: 2024-06-24

## 2024-06-24 PROBLEM — J96.01 ACUTE HYPOXEMIC RESPIRATORY FAILURE: Status: ACTIVE | Noted: 2024-06-24

## 2024-06-24 PROBLEM — T14.91XA SUICIDAL BEHAVIOR WITH ATTEMPTED SELF-INJURY: Status: RESOLVED | Noted: 2024-06-24 | Resolved: 2024-06-24

## 2024-06-24 PROBLEM — T50.902A INTENTIONAL DRUG OVERDOSE: Status: ACTIVE | Noted: 2024-06-23

## 2024-06-24 LAB
OHS QRS DURATION: 80 MS
OHS QRS DURATION: 86 MS
OHS QTC CALCULATION: 410 MS
OHS QTC CALCULATION: 436 MS

## 2024-06-24 NOTE — ED NOTES
Pressure soft after getting to bedside commode and ativan dosing. Will monitor closely and adjust pressors as needed.

## 2024-06-24 NOTE — ED NOTES
Patient mumbling, incoherent speech at this time with tremor and restlessness most likely related to ativan.  Norepinephrine increased to 0.5 mcg/kg/min to maintain MAP. Urine output continues to be worrisome with only approximately 100ml total since arrival. Have been in contact with hospital medicine concerning condition along with toxicology who does not recommend dialysis at this point. Will continue to closely monitor.

## 2024-06-24 NOTE — PROGRESS NOTES
Progress Note     6/23/2024, 6:50 PM    I was called on by Parth Cavazos RN to provide additional care for Mr. Brian Helm while on duty in the emergency department. Patient is holding in ED and hospitalist handoff occurring between 6:00 PM - 7:00 PM and tox center is being consulted, so I am stepping in to assist discussing with toxicology.     Discussed with toxicology fellow and state .     They fully agree with all care as rendered and state the patient is not currently a hemodialysis candidate. They reaffirm no current role for high-dose insulin or lipids. Continue vasopressor support as needed and IV fluids. They expect demand-related increased in Cr and trop to crest and hopefully fall.     Contact them with any deterioration, especially if need for a second pressor.     Interval History: patient's Cr increasing to 2.7; BP stable on 0.2 mcg/kg/min levophed    Interval Exam: Calm, NAD, mild tachycardia    Vital signs reviewed.   Vitals:    06/23/24 1407 06/23/24 1412 06/23/24 1503 06/23/24 1547   BP: (!) 102/57 (!) 107/58 (!) 101/54 (!) 97/52   Pulse: 102 109 104 100   Resp: (!) 23 (!) 27 16 17   Temp:       TempSrc:       SpO2: (!) 90% (!) 92% (!) 90% 95%   Weight:       Height:        06/23/24 1550 06/23/24 1613 06/23/24 1645 06/23/24 1647   BP:  (!) 106/54  (!) 99/54   Pulse:  99 98 96   Resp:  16 (!) 25 19   Temp:       TempSrc:       SpO2: (!) 94% 95% 95% 95%   Weight:       Height:        06/23/24 1652 06/23/24 1654 06/23/24 1757 06/23/24 1807   BP:   (!) 100/59 (!) 109/59   Pulse:   102 102   Resp:   17 20   Temp:       TempSrc:       SpO2: 96% 96% 95% 95%   Weight:       Height:        06/23/24 1850 06/23/24 1908 06/23/24 1915   BP:   (!) 105/55   Pulse:  104 99   Resp:  (!) 27 (!) 25   Temp:      TempSrc:      SpO2: (!) 93% 95% 97%   Weight:      Height:        Procedures    Assessment: CCB overdose      ICD-10-CM ICD-9-CM   1. Calcium channel blocker overdose, intentional  self-harm, initial encounter  T46.1X2A 972.9     E950.4   2. Overdose  T50.901A 977.9     E980.5   3. Encounter for central line placement  Z45.2 V58.81   4. Chest pain  R07.9 786.50   5. CHF (congestive heart failure)  I50.9 428.0   6. Non-ST elevation MI (NSTEMI)  I21.4 410.70   7. Drug-induced hypotension  I95.2 458.8     E947.9     Plan: continue levophed to MAP >65, IVF, re-consult toxicology PRN, no current indication for hemodialysis, care per hospitalist team

## 2024-06-24 NOTE — PROVIDER PROGRESS NOTES - EMERGENCY DEPT.
Encounter Date: 6/23/2024    ED Physician Progress Notes           Progress Note     6/23/2024, 10:35 PM    Interval History: I was called on by hospitalist Dr. Olsen and [facility] hospitalist team to provide additional care for Mr. Brian Helm while on duty in the emergency department.  They have decided to transfer patient and would like him intubated prior to sending.     Interval Exam: patient very tachypneic; hypervolemic with edema and low UOP. Metabolic acidosis with tenuous respiratory compensation.     Vital signs reviewed.   Vitals:    06/23/24 1915 06/23/24 1953 06/23/24 2003 06/23/24 2008   BP: (!) 105/55 (!) 87/53 (!) 84/53 (!) 84/54   Pulse: 99 103 101 101   Resp: (!) 25 (!) 27 (!) 26 (!) 25   Temp:       TempSrc:       SpO2: 97% (!) 92% (!) 93% (!) 92%   Weight:       Height:        06/23/24 2013 06/23/24 2023 06/23/24 2024 06/23/24 2027   BP: (!) 81/53 (!) 88/52 (!) 100/56 (!) 93/52   Pulse: 99 98 98 99   Resp: (!) 29 (!) 28 (!) 28 (!) 26   Temp:       TempSrc:       SpO2: (!) 93% (!) 94% (!) 94% (!) 92%   Weight:       Height:        06/23/24 2035 06/23/24 2039 06/23/24 2043 06/23/24 2048   BP: (!) 90/53 (!) 90/52 (!) 95/57 (!) 92/53   Pulse: 98 98 97 97   Resp: (!) 30 (!) 26 (!) 28 (!) 28   Temp:       TempSrc:       SpO2: (!) 93% (!) 92% (!) 93% (!) 92%   Weight:       Height:        06/23/24 2107 06/23/24 2153 06/23/24 2213   BP: (!) 111/59 (!) 103/57 (!) 94/54   Pulse: 99 100 98   Resp: (!) 21 (!) 31 (!) 29   Temp:      TempSrc:      SpO2: (!) 90% (!) 91% (!) 90%   Weight:      Height:        Intubation    Date/Time: 6/23/2024 10:35 PM  Location procedure was performed: [facility] EMERGENCY DEPARTMENT    Performed by: Francisco Wilson MD  Authorized by: Francisco Wilson MD  Consent Done: Emergent Situation  Indications: respiratory failure and airway protection  Intubation method: direct  Patient status: paralyzed (RSI)  Preoxygenation: BVM  Sedatives: see MAR for details  Laryngoscope size:  Mac 4  Tube size: 8.0 mm  Tube type: cuffed  Number of attempts: 1  Cricoid pressure: no  Cords visualized: yes  Post-procedure assessment: chest rise, ETCO2 monitor and CO2 detector  Breath sounds: equal and absent over the epigastrium  Cuff inflated: yes  ETT to lip: 22 cm  Tube secured with: ETT bowman  Chest x-ray interpreted by me.  Chest x-ray findings: endotracheal tube in appropriate position  Patient tolerance: Patient tolerated the procedure well with no immediate complications  Complications: No  Specimens: No  Implants: No  Comments: Quick uncomplicated intubation with grade 2 view      Critical Care    Date/Time: 6/23/2024 10:00 PM    Performed by: Francisco Wilson MD  Authorized by: Francisco Wilson MD  Direct patient critical care time: 14 minutes  Additional history critical care time: 5 minutes  Documentation critical care time: 6 minutes  Consulting other physicians critical care time: 6 minutes  Total critical care time (exclusive of procedural time) : 31 minutes  Critical care time was exclusive of separately billable procedures and treating other patients and teaching time.  Critical care was necessary to treat or prevent imminent or life-threatening deterioration of the following conditions: metabolic crisis, respiratory failure and toxidrome.  Critical care was time spent personally by me on the following activities: blood draw for specimens, development of treatment plan with patient or surrogate, interpretation of cardiac output measurements, evaluation of patient's response to treatment, examination of patient, obtaining history from patient or surrogate, ordering and performing treatments and interventions, ordering and review of laboratory studies, ordering and review of radiographic studies, pulse oximetry, re-evaluation of patient's condition, review of old charts and discussions with consultants.          Assessment:     ICD-10-CM ICD-9-CM   1. Calcium channel blocker overdose,  intentional self-harm, initial encounter  T46.1X2A 972.9     E950.4   2. Overdose  T50.901A 977.9     E980.5   3. Encounter for central line placement  Z45.2 V58.81   4. Chest pain  R07.9 786.50   5. CHF (congestive heart failure)  I50.9 428.0   6. Non-ST elevation MI (NSTEMI)  I21.4 410.70   7. Drug-induced hypotension  I95.2 458.8     E947.9   8. SOB (shortness of breath) on exertion  R06.02 786.05   9. Encounter for intubation  Z01.818 V72.83     Plan: Intubated with attention to maintaining CO2 compensation. Tolerated well. Sedation initiated.

## 2024-06-24 NOTE — ED NOTES
Patient calm, resting and cooperative. Does c/o body aches from lying in bed all day and some abdominal cramping and feels like he needs to have a bowel movement but expresses he doesn't have the urge to go at this time. Remains vitally stable. Awaiting recent labs for further.

## 2024-06-24 NOTE — ED NOTES
Bladder scan performed at bedside via ultrasound. Less than 9 ml noted. Irrigated catheter using sterile technique with 60ml 0.9% NaCL, all 60 ml returned from catheter.

## 2024-06-28 LAB — BACTERIA BLD CULT: NORMAL

## 2024-07-02 PROBLEM — Z73.6 LIMITATION OF ACTIVITY DUE TO DISABILITY: Status: ACTIVE | Noted: 2024-07-02

## 2024-07-02 PROBLEM — R54 AGE-RELATED PHYSICAL DEBILITY: Status: ACTIVE | Noted: 2024-07-02

## 2024-07-05 PROBLEM — I5A NON-ISCHEMIC MYOCARDIAL INJURY (NON-TRAUMATIC): Status: ACTIVE | Noted: 2024-06-24

## 2024-07-06 PROBLEM — R57.9 SHOCK: Status: RESOLVED | Noted: 2024-06-24 | Resolved: 2024-07-06

## 2024-07-06 PROBLEM — I95.2 HYPOTENSION DUE TO DRUGS: Status: RESOLVED | Noted: 2024-06-24 | Resolved: 2024-07-06

## 2024-07-06 PROBLEM — I5A NON-ISCHEMIC MYOCARDIAL INJURY (NON-TRAUMATIC): Status: RESOLVED | Noted: 2024-06-24 | Resolved: 2024-07-06
